# Patient Record
Sex: MALE | Race: WHITE | NOT HISPANIC OR LATINO | Employment: OTHER | URBAN - NONMETROPOLITAN AREA
[De-identification: names, ages, dates, MRNs, and addresses within clinical notes are randomized per-mention and may not be internally consistent; named-entity substitution may affect disease eponyms.]

---

## 2018-01-29 ENCOUNTER — HOSPITAL ENCOUNTER (EMERGENCY)
Facility: HOSPITAL | Age: 78
Discharge: HOME | End: 2018-01-29
Attending: PHYSICIAN ASSISTANT | Admitting: PHYSICIAN ASSISTANT
Payer: MEDICARE

## 2018-01-29 ENCOUNTER — APPOINTMENT (OUTPATIENT)
Dept: GENERAL RADIOLOGY | Facility: HOSPITAL | Age: 78
End: 2018-01-29
Attending: PHYSICIAN ASSISTANT
Payer: MEDICARE

## 2018-01-29 ENCOUNTER — ANESTHESIA (OUTPATIENT)
Dept: EMERGENCY MEDICINE | Facility: HOSPITAL | Age: 78
End: 2018-01-29
Payer: MEDICARE

## 2018-01-29 ENCOUNTER — ANESTHESIA EVENT (OUTPATIENT)
Dept: EMERGENCY MEDICINE | Facility: HOSPITAL | Age: 78
End: 2018-01-29
Payer: MEDICARE

## 2018-01-29 ENCOUNTER — APPOINTMENT (OUTPATIENT)
Dept: CT IMAGING | Facility: HOSPITAL | Age: 78
End: 2018-01-29
Attending: PHYSICIAN ASSISTANT
Payer: MEDICARE

## 2018-01-29 VITALS
RESPIRATION RATE: 29 BRPM | HEART RATE: 82 BPM | BODY MASS INDEX: 32.14 KG/M2 | HEIGHT: 66 IN | OXYGEN SATURATION: 96 % | SYSTOLIC BLOOD PRESSURE: 138 MMHG | WEIGHT: 200 LBS | TEMPERATURE: 98.9 F | DIASTOLIC BLOOD PRESSURE: 97 MMHG

## 2018-01-29 DIAGNOSIS — I48.91 ATRIAL FIBRILLATION, UNSPECIFIED TYPE (H): ICD-10-CM

## 2018-01-29 DIAGNOSIS — R07.9 CHEST PAIN, UNSPECIFIED TYPE: ICD-10-CM

## 2018-01-29 LAB
ALBUMIN SERPL-MCNC: 3.7 G/DL (ref 3.4–5)
ALP SERPL-CCNC: 72 U/L (ref 40–150)
ALT SERPL W P-5'-P-CCNC: 32 U/L (ref 0–70)
ANION GAP SERPL CALCULATED.3IONS-SCNC: 8 MMOL/L (ref 3–14)
AST SERPL W P-5'-P-CCNC: 23 U/L (ref 0–45)
BASOPHILS # BLD AUTO: 0.1 10E9/L (ref 0–0.2)
BASOPHILS NFR BLD AUTO: 0.5 %
BILIRUB SERPL-MCNC: 0.6 MG/DL (ref 0.2–1.3)
BUN SERPL-MCNC: 18 MG/DL (ref 7–30)
CALCIUM SERPL-MCNC: 8.5 MG/DL (ref 8.5–10.1)
CHLORIDE SERPL-SCNC: 99 MMOL/L (ref 94–109)
CO2 SERPL-SCNC: 29 MMOL/L (ref 20–32)
CREAT SERPL-MCNC: 0.81 MG/DL (ref 0.66–1.25)
DIFFERENTIAL METHOD BLD: ABNORMAL
EOSINOPHIL # BLD AUTO: 0.1 10E9/L (ref 0–0.7)
EOSINOPHIL NFR BLD AUTO: 0.8 %
ERYTHROCYTE [DISTWIDTH] IN BLOOD BY AUTOMATED COUNT: 14.6 % (ref 10–15)
GFR SERPL CREATININE-BSD FRML MDRD: >90 ML/MIN/1.7M2
GLUCOSE SERPL-MCNC: 285 MG/DL (ref 70–99)
HCT VFR BLD AUTO: 43.5 % (ref 40–53)
HGB BLD-MCNC: 14.7 G/DL (ref 13.3–17.7)
IMM GRANULOCYTES # BLD: 0.1 10E9/L (ref 0–0.4)
IMM GRANULOCYTES NFR BLD: 0.5 %
LYMPHOCYTES # BLD AUTO: 2 10E9/L (ref 0.8–5.3)
LYMPHOCYTES NFR BLD AUTO: 18.2 %
MCH RBC QN AUTO: 29.8 PG (ref 26.5–33)
MCHC RBC AUTO-ENTMCNC: 33.8 G/DL (ref 31.5–36.5)
MCV RBC AUTO: 88 FL (ref 78–100)
MONOCYTES # BLD AUTO: 0.8 10E9/L (ref 0–1.3)
MONOCYTES NFR BLD AUTO: 7.6 %
NEUTROPHILS # BLD AUTO: 8 10E9/L (ref 1.6–8.3)
NEUTROPHILS NFR BLD AUTO: 72.4 %
NRBC # BLD AUTO: 0 10*3/UL
NRBC BLD AUTO-RTO: 0 /100
NT-PROBNP SERPL-MCNC: 894 PG/ML (ref 0–1800)
PLATELET # BLD AUTO: 275 10E9/L (ref 150–450)
POTASSIUM SERPL-SCNC: 4.4 MMOL/L (ref 3.4–5.3)
PROT SERPL-MCNC: 7.5 G/DL (ref 6.8–8.8)
RBC # BLD AUTO: 4.94 10E12/L (ref 4.4–5.9)
SODIUM SERPL-SCNC: 136 MMOL/L (ref 133–144)
TROPONIN I SERPL-MCNC: <0.015 UG/L (ref 0–0.04)
TROPONIN I SERPL-MCNC: <0.015 UG/L (ref 0–0.04)
WBC # BLD AUTO: 11.1 10E9/L (ref 4–11)

## 2018-01-29 PROCEDURE — A9270 NON-COVERED ITEM OR SERVICE: HCPCS | Mod: GY

## 2018-01-29 PROCEDURE — 93010 ELECTROCARDIOGRAM REPORT: CPT | Performed by: INTERNAL MEDICINE

## 2018-01-29 PROCEDURE — 25000132 ZZH RX MED GY IP 250 OP 250 PS 637: Mod: GY

## 2018-01-29 PROCEDURE — 25000128 H RX IP 250 OP 636: Performed by: PHYSICIAN ASSISTANT

## 2018-01-29 PROCEDURE — 99284 EMERGENCY DEPT VISIT MOD MDM: CPT | Performed by: PHYSICIAN ASSISTANT

## 2018-01-29 PROCEDURE — 85025 COMPLETE CBC W/AUTO DIFF WBC: CPT | Performed by: PHYSICIAN ASSISTANT

## 2018-01-29 PROCEDURE — 80053 COMPREHEN METABOLIC PANEL: CPT | Performed by: PHYSICIAN ASSISTANT

## 2018-01-29 PROCEDURE — 99285 EMERGENCY DEPT VISIT HI MDM: CPT | Mod: 25

## 2018-01-29 PROCEDURE — 83880 ASSAY OF NATRIURETIC PEPTIDE: CPT | Performed by: PHYSICIAN ASSISTANT

## 2018-01-29 PROCEDURE — 84484 ASSAY OF TROPONIN QUANT: CPT | Mod: 91 | Performed by: PHYSICIAN ASSISTANT

## 2018-01-29 PROCEDURE — 71046 X-RAY EXAM CHEST 2 VIEWS: CPT | Mod: TC

## 2018-01-29 PROCEDURE — 37000011 ZZH ANESTHESIA WARD SERVICE: Performed by: NURSE ANESTHETIST, CERTIFIED REGISTERED

## 2018-01-29 PROCEDURE — 36415 COLL VENOUS BLD VENIPUNCTURE: CPT | Performed by: PHYSICIAN ASSISTANT

## 2018-01-29 PROCEDURE — 71275 CT ANGIOGRAPHY CHEST: CPT | Mod: TC

## 2018-01-29 PROCEDURE — 93005 ELECTROCARDIOGRAM TRACING: CPT

## 2018-01-29 RX ORDER — IOPAMIDOL 755 MG/ML
75 INJECTION, SOLUTION INTRAVASCULAR ONCE
Status: COMPLETED | OUTPATIENT
Start: 2018-01-29 | End: 2018-01-29

## 2018-01-29 RX ORDER — METOPROLOL SUCCINATE 25 MG/1
25 TABLET, EXTENDED RELEASE ORAL DAILY
Qty: 30 TABLET | Refills: 0 | Status: SHIPPED | OUTPATIENT
Start: 2018-01-29 | End: 2018-02-28

## 2018-01-29 RX ORDER — METOPROLOL TARTRATE 25 MG/1
TABLET, FILM COATED ORAL
Status: COMPLETED
Start: 2018-01-29 | End: 2018-01-29

## 2018-01-29 RX ADMIN — METOPROLOL TARTRATE 12.5 MG: 25 TABLET ORAL at 21:38

## 2018-01-29 RX ADMIN — IOPAMIDOL 75 ML: 755 INJECTION, SOLUTION INTRAVENOUS at 20:28

## 2018-01-29 ASSESSMENT — ENCOUNTER SYMPTOMS
CHILLS: 0
DIZZINESS: 0
HEADACHES: 0
NAUSEA: 0
ABDOMINAL PAIN: 0
PALPITATIONS: 0
VOMITING: 0
LIGHT-HEADEDNESS: 0
DIARRHEA: 0
FATIGUE: 0
CHEST TIGHTNESS: 0
ACTIVITY CHANGE: 0
APPETITE CHANGE: 0
COUGH: 0
FEVER: 0

## 2018-01-29 NOTE — ED NOTES
Bed: ED10a  Expected date: 1/29/18  Expected time:   Means of arrival:   Comments:  Chest pain, private vehicle

## 2018-01-29 NOTE — ED AVS SNAPSHOT
[facility]    [address] [address]    Phone:  [phone]                                       Jonatan Das   MRN: [id number]    Department:  [facility]   Date of Visit:  1/29/2018           After Visit Summary Signature Page     I have received my discharge instructions, and my questions have been answered. I have discussed any challenges I see with this plan with the nurse or doctor.    ..........................................................................................................................................  Patient/Patient Representative Signature      ..........................................................................................................................................  Patient Representative Print Name and Relationship to Patient    ..................................................               ................................................  Date                                            Time    ..........................................................................................................................................  Reviewed by Signature/Title    ...................................................              ..............................................  Date                                                            Time

## 2018-01-29 NOTE — ED AVS SNAPSHOT
[facility]    [address] [place] [address]    Phone:  [phone]                                       Jonatan Das   MRN: [id number]    Department:  [facility]   Date of Visit:  1/29/2018           Patient Information     Date Of Birth          1940        Your diagnoses for this visit were:     Chest pain, unspecified type     Atrial fibrillation, unspecified type (H)        You were seen by Ivette Lyles PA-C.      Follow-up Information     Follow up with Clinic, [facility].    Contact information:    [address]  [address]  [place] [address]  [phone]          Follow up with [facility].    Specialty:  EMERGENCY MEDICINE    Why:  If symptoms worsen    Contact information:    [address]  [place] [address] [phone]    Additional information:    From [facility]: Take [address]. Turn left at [address]. Turn left at the first stoplight on [address]. At the first stop sign, take a right onto [address]. Take a left into the parking lot and continue through until you reach the North enterance of the building.       From [place]: Take [address]. Take the [address] ramp towards [place]. Turn left onto [address]. Take a slight right onto [address]. Turn left at the first stoplight on [address]. At the first stop sign, take a right onto [address]. Take a left into the parking lot and continue through until you reach the North enterance of the building.       From [place]: Take [address]. Take a right at [address]. At the first stop sign, take a right onto [address]. Take a left into the parking lot and continue through until you reach the North enterance of the building.         Discharge Instructions       What to expect when you have contrast    During your exam, we will inject  contrast  into your vein or  artery. (Contrast is a clear liquid with iodine in it. It shows up on X-rays.)    You may feel warm or hot. You may have a metal taste in your mouth and a slight upset stomach. You may also feel pressure near the kidneys and bladder. These effects will last about 1 to 3 minutes.    Please tell us if you have:    Sneezing     Itching    Hives     Swelling in the face    A hoarse voice    Breathing problems    Other new symptoms    Serious problems are rare.  They may include:    Irregular heartbeat     Seizures    Kidney failure              Tissue damage    Shock      Death    If you have any problems during the exam, we  will treat them right away.    When you get home    Call your hospital if you have any new symptoms in the next 2 days, like hives or swelling. (Phone numbers are at the bottom of this page.) Or call your family doctor.     If you have wheezing or trouble breathing, call 911.    Self-care  -Drink at least 4 extra glasses of water today.   This reduces the stress on your kidneys.  -Keep taking your regular medicines.    The contrast will pass out of your body in your  Urine(pee). This will happen in the next 24 hours. You  will not feel this. Your urine will not  change color.    If you have kidney problems or take metformin    Drink 4 to 8 large glasses of water for the next  2 days, if you are not on a fluid restriction.    ?If you take metformin (Glucophage or Glucovance) for diabetes, keep taking it.        (Note to provider:please give patient prescription for lab tests.)    ?Special instructions: DRINK EXTRA WATER FOR THE NEXT 2 DAYS    I have read and understand the above information.    Patient Sign Here:______________________________________Date:________Time:______    Staff Sign Here:________________________________________Date:_______Time:______      Radiology Departments:     ?[facility]: [phone] ?[facility]: [phone]     ?[place]: [phone] ?[facility]:[phone]       ?[place]: [phone]  ?[place]: [phone]  ?[place]:[phone]    ?[facility]:[phone]  ?[facility]:[phone]       Review of your medicines      START taking        Dose / Directions Last dose taken    metoprolol succinate 25 MG 24 hr tablet   Commonly known as:  TOPROL-XL   Dose:  25 mg   Quantity:  30 tablet        Take 1 tablet (25 mg) by mouth daily   Refills:  0          Our records show that you are taking the medicines listed below. If these are incorrect, please call your family doctor or clinic.        Dose / Directions Last dose taken    acetaminophen 325 MG tablet   Commonly known as:  TYLENOL   Dose:  650 mg   Quantity:  100 tablet        Take 2 tablets (650 mg) by mouth every 4 hours as needed for mild pain   Refills:  0        acetaminophen-codeine 300-30 MG per tablet   Commonly known as:  TYLENOL #3   Dose:  1-2 tablet        Take 1-2 tablets by mouth every 4 hours as needed for moderate pain   Refills:  0        ASPIRIN PO   Dose:  325 mg        Take 325 mg by mouth daily   Refills:  0        ATORVASTATIN CALCIUM PO   Dose:  80 mg        Take 80 mg by mouth daily   Refills:  0        insulin glargine 100 UNIT/ML injection   Commonly known as:  LANTUS   Dose:  50 Units        Inject 50 Units Subcutaneous 2 times daily   Refills:  0        LISINOPRIL PO   Dose:  2.5 mg        Take 2.5 mg by mouth daily   Refills:  0        METFORMIN HCL PO   Dose:  1000 mg        Take 1,000 mg by mouth daily (with breakfast)   Refills:  0                Prescriptions were sent or printed at these locations (1 Prescription)                   [facility] - [place]ILARY HERN[facility]NDEZ - [address]   [address] [address]    Telephone:  [phone]   Fax:  [phone]   Hours:                  E-Prescribed (1 of 1)         metoprolol succinate (TOPROL-XL) 25 MG 24 hr tablet                Procedures and tests performed during your visit     Procedure/Test Number of Times  "Performed    CBC with platelets differential 1    CTA Angiogram Chest w/o & w Contrast 1    Comprehensive metabolic panel 1    EKG 12-lead, tracing only 1    Nt probnp inpatient (BNP) 1    Peripheral IV catheter 1    Troponin I 2    XR Chest 2 Views 1      Orders Needing Specimen Collection     None      Pending Results     No orders found from 2018 to 2018.            Pending Culture Results     No orders found from 2018 to 2018.            Thank you for choosing [facility]       Thank you for choosing [facility] for your care. Our goal is always to provide you with excellent care. Hearing back from our patients is one way we can continue to improve our services. Please take a few minutes to complete the written survey that you may receive in the mail after you visit with us. Thank you!        [employer] Information     [employer] lets you send messages to your doctor, view your test results, renew your prescriptions, schedule appointments and more. To sign up, go to [email or web address] . Click on \"Log in\" on the left side of the screen, which will take you to the Welcome page. Then click on \"Sign up Now\" on the right side of the page.     You will be asked to enter the access code listed below, as well as some personal information. Please follow the directions to create your username and password.     Your access code is: [email or web address]  Expires: 2018  9:30 PM     Your access code will  in 90 days. If you need help or a new code, please call your [facility] clinic or [phone].        Care EveryWhere ID     This is your Care EveryWhere ID. This could be used by other organizations to access your [facility] medical records  [id number]        Equal Access to Services     [facility] : Lizzie Gaspar, michael almanza, percy castelan. So [facility] [phone].    ATENCIÓN: Si habla español, tiene a holman disposición servicios " freeman de asistencia lingüística. Quintin torres [phone].    We comply with applicable federal civil rights laws and [place] laws. We do not discriminate on the basis of race, color, national origin, age, disability, sex, sexual orientation, or gender identity.            After Visit Summary       This is your record. Keep this with you and show to your community pharmacist(s) and doctor(s) at your next visit.

## 2018-01-30 NOTE — ED NOTES
1/30/18 @ 1120:  [facility] stating no rx there. E scribed per JOHANN Barrera toprol xl 25 mg one tab daily for 30 days with no refills.

## 2018-01-30 NOTE — ED NOTES
Discharge instructions gone over with patient and he states understanding.  Patient is then discharged in stable condition ambulatory to waiting room to wait for son. We called him and he is coming to pick him up.

## 2018-01-30 NOTE — DISCHARGE INSTRUCTIONS
What to expect when you have contrast    During your exam, we will inject  contrast  into your vein or artery. (Contrast is a clear liquid with iodine in it. It shows up on X-rays.)    You may feel warm or hot. You may have a metal taste in your mouth and a slight upset stomach. You may also feel pressure near the kidneys and bladder. These effects will last about 1 to 3 minutes.    Please tell us if you have:    Sneezing     Itching    Hives     Swelling in the face    A hoarse voice    Breathing problems    Other new symptoms    Serious problems are rare.  They may include:    Irregular heartbeat     Seizures    Kidney failure              Tissue damage    Shock      Death    If you have any problems during the exam, we  will treat them right away.    When you get home    Call your hospital if you have any new symptoms in the next 2 days, like hives or swelling. (Phone numbers are at the bottom of this page.) Or call your family doctor.     If you have wheezing or trouble breathing, call 911.    Self-care  -Drink at least 4 extra glasses of water today.   This reduces the stress on your kidneys.  -Keep taking your regular medicines.    The contrast will pass out of your body in your  Urine(pee). This will happen in the next 24 hours. You  will not feel this. Your urine will not  change color.    If you have kidney problems or take metformin    Drink 4 to 8 large glasses of water for the next  2 days, if you are not on a fluid restriction.    ?If you take metformin (Glucophage or Glucovance) for diabetes, keep taking it.        (Note to provider:please give patient prescription for lab tests.)    ?Special instructions: DRINK EXTRA WATER FOR THE NEXT 2 DAYS    I have read and understand the above information.    Patient Sign Here:______________________________________Date:________Time:______    Staff Sign Here:________________________________________Date:_______Time:______      Radiology Departments:     ?[facility]: [phone] ?[place]: [phone]     ?[place]: [phone] ?[facility]:[phone]      ?[place]: [phone]  ?[place]: [phone]  ?[place]:[phone]    ?[facility]:[phone]  ?[facility]:[phone]

## 2018-01-30 NOTE — ED NOTES
"Pt getting worked up about the probability of taking Coumadin. \"There are so many side effects from those medicines.\"  "

## 2018-01-30 NOTE — ED NOTES
Patient had IV in left AC that was not functioning when CT took him for Chest CT. He was brought bad to room and when attempting to flush IV it infiltrated and was removed. Myself and 1 other RN attempted at starting another IV without success.  Anesthesia is called to come and look.

## 2018-02-21 ENCOUNTER — DOCUMENTATION ONLY (OUTPATIENT)
Dept: FAMILY MEDICINE | Facility: OTHER | Age: 78
End: 2018-02-21

## 2018-04-16 ENCOUNTER — TELEPHONE (OUTPATIENT)
Dept: CARDIOLOGY | Facility: OTHER | Age: 78
End: 2018-04-16

## 2018-04-16 NOTE — TELEPHONE ENCOUNTER
Milly from [facility] in [place] called [phone]) stating that patients echo needs to be rescheduled because the patient does not want to leave with all of the snow.  Call sent to Radiology department to reschedule.  After reviewing the chart, call placed back to Milly and informed her that this is not a patient of Dr. Ramirez, Roxi Morgan, or Dr. Chavez so she should find out who the primary is and inform them.  Milly states understanding and will find out who ordered this and which doctor patient is seeing.  Milly also states she left a message with [facility] to call her back to reschedule the echo.

## 2018-08-06 NOTE — ED PROVIDER NOTES
History     Chief Complaint   Patient presents with     Chest Pain     The history is provided by the patient and a relative.     Jonatan Das is a 77 year old male who presented to the emergency department ambulatory along with son for evaluation of chest pain.  Chest pain began approximately 2 hours prior to arrival.  Son is a local paramedic. His son did a 12-lead EKG and found his father was a new onset atrial fibrillation.  The chest pain was described as severe, sharp, located in the anterior chest and left lower abdomen.  There was radiation to the left arm as well as down the left leg.  He took 2 nitroglycerin with improvement of the pain from 8 on the 10 scale to a 3 on the 10 scale.  On his arrival here he denied pain.  He does admit to some increasing dyspnea on exertion recently.  No fever.  No dyspnea at rest.  He has had an appendectomy as well as cholecystectomy.    Problem List:    Patient Active Problem List    Diagnosis Date Noted     History of stroke 05/24/2015     Priority: Medium     Aspiration pneumonia (H) 05/24/2015     Priority: Medium     Diabetes mellitus, type 2 (H) 05/24/2015     Priority: Medium     S/P appendectomy 05/24/2015     Priority: Medium     S/P cholecystectomy 05/24/2015     Priority: Medium     Mixed hyperlipidemia 05/24/2015     Priority: Medium     Flu 12/22/2014     Priority: Medium        Past Medical History:    Past Medical History:   Diagnosis Date     Arthritis      CVA (cerebral infarction)      Diabetes (H)        Past Surgical History:    Past Surgical History:   Procedure Laterality Date     CHOLECYSTECTOMY       GENITOURINARY SURGERY       ORTHOPEDIC SURGERY      knee surgery       Family History:    No family history on file.    Social History:  Marital Status:   [2]  Social History   Substance Use Topics     Smoking status: Never Smoker     Smokeless tobacco: Not on file     Alcohol use No        Medications:      acetaminophen-codeine (TYLENOL  "#3) 300-30 MG per tablet   ASPIRIN PO   metoprolol succinate (TOPROL-XL) 25 MG 24 hr tablet   METFORMIN HCL PO   LISINOPRIL PO   ATORVASTATIN CALCIUM PO   insulin glargine (LANTUS VIAL) 100 UNIT/ML soln   acetaminophen (TYLENOL) 325 MG tablet         Review of Systems   Constitutional: Negative for activity change, appetite change, chills, fatigue and fever.   Eyes:        Left eye blindness   Respiratory: Negative for cough and chest tightness.         He admits to some minimal increasing shortness of breath with exertion recently   Cardiovascular: Positive for chest pain. Negative for palpitations.   Gastrointestinal: Negative for abdominal pain, diarrhea, nausea and vomiting.   Genitourinary: Negative.    Skin: Negative.    Neurological: Negative for dizziness, light-headedness and headaches.       Physical Exam   BP: 144/71  Pulse: 82  Heart Rate: 73  Temp: 98.7  F (37.1  C)  Resp: 21  Height: 167.6 cm (5' 6\")  Weight: 90.7 kg (200 lb)  SpO2: 94 %      Physical Exam   Constitutional: He is oriented to person, place, and time. He appears well-developed and well-nourished. No distress.   Eyes:   Left eye patch/[place]   Cardiovascular: Normal rate.    Irregularly irregular   Pulmonary/Chest: Effort normal and breath sounds normal.   Abdominal: Soft. There is no tenderness. There is no guarding.   Neurological: He is alert and oriented to person, place, and time.   Skin: Skin is warm and dry.   Psychiatric: He has a normal mood and affect.   Nursing note and vitals reviewed.      ED Course     ED Course     Procedures          EKG shows atrial fibrillation.  He has nonspecific T-wave inversions in lead I and aVL.  In reviewing his previous EKGs from 2014 the T-wave abnormalities were present at that time.    Chest ray is negative for focal infiltrate, widened mediastinum, or pneumothorax.    Medications   iopamidol (ISOVUE-370) solution 75 mL (75 mLs Intravenous Given 1/29/18 2028)   sodium chloride (PF) 0.9% PF " flush 80 mL (80 mLs Intravenous Given 1/29/18 2028)   metoprolol tartrate (LOPRESSOR) half-tab 12.5 mg (12.5 mg Oral Given 1/29/18 2138)     Results for orders placed or performed during the hospital encounter of 01/29/18   XR Chest 2 Views    Narrative    XR CHEST 2 VW    HISTORY: 77 years Male chest pain;     COMPARISON: 5/20/2015    TECHNIQUE: 2 views of the chest were obtained.    FINDINGS: Two views of the chest were obtained. Heart size and  pulmonary vascularity are within normal limits, lungs are clear both  views. No consolidating air space opacities are present.          Impression    IMPRESSION: Clear chest.    MARILYN MCDANIEL MD   CTA Angiogram Chest w/o & w Contrast    Narrative    CTA ANGIOGRAM CHEST CONTRAST    HISTORY: 77 years Male with chest pain. Dissection;     TECHNIQUE: Axial CT imaging of the chest was performed With  intravenous contrast. Coronal and sagittal reconstructions were  obtained.    COMPARISON: None    FINDINGS:  There is no evidence of thoracic aortic aneurysm or dissection. There  are no filling defects within the pulmonary arteries to suggest  pulmonary embolism.  There is no mediastinal or hilar or axillary lymphadenopathy.    There is mild dependent atelectasis. There is a stellate opacity  within the lingula close to the costophrenic sulcus with architectural  distortion suggestive of atelectasis. Lungs are otherwise clear.         There is a wedge-shaped low-density lesion within the spleen. There is  no change in the contour of the splenic capsule.      Impression    IMPRESSION: No evidence of aortic aneurysm or thoracic aortic  dissection. There is no evidence of pulmonary embolism.    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    MARILYN MCDANIEL MD   CBC with platelets differential   Result Value Ref [place]    WBC 11.1 (H) 4.0 - 11.0 10e9/L    RBC Count 4.94 4.4 - 5.9 10e12/L    Hemoglobin 14.7 13.3 - 17.7 g/dL     Hematocrit 43.5 40.0 - 53.0 %    MCV 88 78 - 100 fl    MCH 29.8 26.5 - 33.0 pg    MCHC 33.8 31.5 - 36.5 g/dL    RDW 14.6 10.0 - 15.0 %    Platelet Count 275 150 - 450 10e9/L    Diff Method Automated Method     % Neutrophils 72.4 %    % Lymphocytes 18.2 %    % Monocytes 7.6 %    % Eosinophils 0.8 %    % Basophils 0.5 %    % Immature Granulocytes 0.5 %    Nucleated RBCs 0 0 /100    Absolute Neutrophil 8.0 1.6 - 8.3 10e9/L    Absolute Lymphocytes 2.0 0.8 - 5.3 10e9/L    Absolute Monocytes 0.8 0.0 - 1.3 10e9/L    Absolute Eosinophils 0.1 0.0 - 0.7 10e9/L    Absolute Basophils 0.1 0.0 - 0.2 10e9/L    Abs Immature Granulocytes 0.1 0 - 0.4 10e9/L    Absolute Nucleated RBC 0.0    Comprehensive metabolic panel   Result Value Ref [place]    Sodium 136 133 - 144 mmol/L    Potassium 4.4 3.4 - 5.3 mmol/L    Chloride 99 94 - 109 mmol/L    Carbon Dioxide 29 20 - 32 mmol/L    Anion Gap 8 3 - 14 mmol/L    Glucose 285 (H) 70 - 99 mg/dL    Urea Nitrogen 18 7 - 30 mg/dL    Creatinine 0.81 0.66 - 1.25 mg/dL    GFR Estimate >90 >60 mL/min/1.7m2    GFR Estimate If Black >90 >60 mL/min/1.7m2    Calcium 8.5 8.5 - 10.1 mg/dL    Bilirubin Total 0.6 0.2 - 1.3 mg/dL    Albumin 3.7 3.4 - 5.0 g/dL    Protein Total 7.5 6.8 - 8.8 g/dL    Alkaline Phosphatase 72 40 - 150 U/L    ALT 32 0 - 70 U/L    AST 23 0 - 45 U/L   Troponin I   Result Value Ref [place]    Troponin I ES <0.015 0.000 - 0.045 ug/L   Nt probnp inpatient (BNP)   Result Value Ref [place]    N-Terminal Pro BNP Inpatient 894 0 - 1800 pg/mL   Troponin I   Result Value Ref [place]    Troponin I ES <0.015 0.000 - 0.045 ug/L     Critical Care time:  none               Labs Ordered and Resulted from Time of ED Arrival Up to the Time of Departure from the ED   CBC WITH PLATELETS DIFFERENTIAL - Abnormal; Notable for the following:        Result Value    WBC 11.1 (*)     All other components within normal limits   COMPREHENSIVE METABOLIC PANEL - Abnormal; Notable for the following:     Glucose 285 (*)      All other components within normal limits   TROPONIN I   NT PROBNP INPATIENT   TROPONIN I   PERIPHERAL IV CATHETER       Assessments & Plan (with Medical Decision Making)   Mr. Das was observed for greater than 4 hours.  He had no chest pain here in the emergency department.  Not consistent with ACS. He feels at his baseline.  Troponin negative ×2.  CT angiogram negative for thoracic aortic aneurysm or pulmonary embolism.  Atrial fibrillation is new, but duration is unknown.  He does have a history of CVA.  His [id number] is 6.  This obviously places him at high risk of CVA.  Discussed with Dr. Zafar.  We will start low dose metoprolol.  I had a long discussion regarding coumadin therapy.  We discussed the risks and benefits at length.  Jonatan was very uncomfortable starting this medication from the ED.  I agree.  This can be discussed in the next 1-2 days with [facility].  Return here for ANY return of pain or other concerns.     I have reviewed the nursing notes.    I have reviewed the findings, diagnosis, plan and need for follow up with the patient.       Discharge Medication List as of 1/29/2018  9:31 PM      START taking these medications    Details   metoprolol succinate (TOPROL-XL) 25 MG 24 hr tablet Take 1 tablet (25 mg) by mouth daily, Disp-30 tablet, R-0, E-Prescribe             Final diagnoses:   Chest pain, unspecified type   Atrial fibrillation, unspecified type (H)       1/29/2018   [facility]     Ivette Lyles PA-C  01/29/18 7749     black pouch bag, silver wallet/Other belongings

## 2020-12-02 ENCOUNTER — HOSPITAL ENCOUNTER (EMERGENCY)
Facility: OTHER | Age: 80
Discharge: HOME | End: 2020-12-02
Attending: FAMILY MEDICINE | Admitting: FAMILY MEDICINE
Payer: COMMERCIAL

## 2020-12-02 ENCOUNTER — APPOINTMENT (OUTPATIENT)
Dept: CT IMAGING | Facility: OTHER | Age: 80
End: 2020-12-02
Attending: FAMILY MEDICINE
Payer: COMMERCIAL

## 2020-12-02 ENCOUNTER — APPOINTMENT (OUTPATIENT)
Dept: GENERAL RADIOLOGY | Facility: OTHER | Age: 80
End: 2020-12-02
Attending: FAMILY MEDICINE
Payer: COMMERCIAL

## 2020-12-02 VITALS
HEIGHT: 66 IN | RESPIRATION RATE: 12 BRPM | HEART RATE: 74 BPM | DIASTOLIC BLOOD PRESSURE: 65 MMHG | BODY MASS INDEX: 36.8 KG/M2 | SYSTOLIC BLOOD PRESSURE: 108 MMHG | WEIGHT: 229 LBS | TEMPERATURE: 97.5 F | OXYGEN SATURATION: 98 %

## 2020-12-02 DIAGNOSIS — I51.89 DIASTOLIC DYSFUNCTION: ICD-10-CM

## 2020-12-02 LAB
ALBUMIN SERPL-MCNC: 3.9 G/DL (ref 3.5–5.7)
ALP SERPL-CCNC: 55 U/L (ref 34–104)
ALT SERPL W P-5'-P-CCNC: 14 U/L (ref 7–52)
ANION GAP SERPL CALCULATED.3IONS-SCNC: 9 MMOL/L (ref 3–14)
AST SERPL W P-5'-P-CCNC: 17 U/L (ref 13–39)
BASOPHILS # BLD AUTO: 0.1 10E9/L (ref 0–0.2)
BASOPHILS NFR BLD AUTO: 0.7 %
BILIRUB SERPL-MCNC: 0.7 MG/DL (ref 0.3–1)
BUN SERPL-MCNC: 19 MG/DL (ref 7–25)
CALCIUM SERPL-MCNC: 9.1 MG/DL (ref 8.6–10.3)
CHLORIDE SERPL-SCNC: 104 MMOL/L (ref 98–107)
CO2 SERPL-SCNC: 23 MMOL/L (ref 21–31)
CREAT SERPL-MCNC: 0.97 MG/DL (ref 0.7–1.3)
DIFFERENTIAL METHOD BLD: ABNORMAL
EOSINOPHIL # BLD AUTO: 0.1 10E9/L (ref 0–0.7)
EOSINOPHIL NFR BLD AUTO: 1.3 %
ERYTHROCYTE [DISTWIDTH] IN BLOOD BY AUTOMATED COUNT: 15.7 % (ref 10–15)
GFR SERPL CREATININE-BSD FRML MDRD: 75 ML/MIN/{1.73_M2}
GLUCOSE SERPL-MCNC: 150 MG/DL (ref 70–105)
HCT VFR BLD AUTO: 41.3 % (ref 40–53)
HGB BLD-MCNC: 13.2 G/DL (ref 13.3–17.7)
IMM GRANULOCYTES # BLD: 0.1 10E9/L (ref 0–0.4)
IMM GRANULOCYTES NFR BLD: 1.2 %
INR PPP: 2.28 (ref 0.86–1.14)
LIPASE SERPL-CCNC: 5 U/L (ref 11–82)
LYMPHOCYTES # BLD AUTO: 1.6 10E9/L (ref 0.8–5.3)
LYMPHOCYTES NFR BLD AUTO: 23 %
MCH RBC QN AUTO: 27.7 PG (ref 26.5–33)
MCHC RBC AUTO-ENTMCNC: 32 G/DL (ref 31.5–36.5)
MCV RBC AUTO: 87 FL (ref 78–100)
MONOCYTES # BLD AUTO: 0.5 10E9/L (ref 0–1.3)
MONOCYTES NFR BLD AUTO: 7.5 %
NEUTROPHILS # BLD AUTO: 4.5 10E9/L (ref 1.6–8.3)
NEUTROPHILS NFR BLD AUTO: 66.3 %
NT-PROBNP SERPL-MCNC: 166 PG/ML (ref 0–100)
PLATELET # BLD AUTO: 247 10E9/L (ref 150–450)
POTASSIUM SERPL-SCNC: 4 MMOL/L (ref 3.5–5.1)
PROT SERPL-MCNC: 6.8 G/DL (ref 6.4–8.9)
RBC # BLD AUTO: 4.76 10E12/L (ref 4.4–5.9)
SODIUM SERPL-SCNC: 136 MMOL/L (ref 134–144)
TROPONIN I SERPL-MCNC: 10 PG/ML
WBC # BLD AUTO: 6.8 10E9/L (ref 4–11)

## 2020-12-02 PROCEDURE — 74177 CT ABD & PELVIS W/CONTRAST: CPT

## 2020-12-02 PROCEDURE — 99285 EMERGENCY DEPT VISIT HI MDM: CPT | Mod: 25 | Performed by: FAMILY MEDICINE

## 2020-12-02 PROCEDURE — 36415 COLL VENOUS BLD VENIPUNCTURE: CPT | Performed by: FAMILY MEDICINE

## 2020-12-02 PROCEDURE — 83880 ASSAY OF NATRIURETIC PEPTIDE: CPT | Performed by: FAMILY MEDICINE

## 2020-12-02 PROCEDURE — 80053 COMPREHEN METABOLIC PANEL: CPT | Performed by: FAMILY MEDICINE

## 2020-12-02 PROCEDURE — 85025 COMPLETE CBC W/AUTO DIFF WBC: CPT | Performed by: FAMILY MEDICINE

## 2020-12-02 PROCEDURE — 83690 ASSAY OF LIPASE: CPT | Performed by: FAMILY MEDICINE

## 2020-12-02 PROCEDURE — 99284 EMERGENCY DEPT VISIT MOD MDM: CPT | Performed by: FAMILY MEDICINE

## 2020-12-02 PROCEDURE — 85610 PROTHROMBIN TIME: CPT | Performed by: FAMILY MEDICINE

## 2020-12-02 PROCEDURE — 71045 X-RAY EXAM CHEST 1 VIEW: CPT

## 2020-12-02 PROCEDURE — 84484 ASSAY OF TROPONIN QUANT: CPT | Performed by: FAMILY MEDICINE

## 2020-12-02 PROCEDURE — 255N000002 HC RX 255 OP 636: Performed by: FAMILY MEDICINE

## 2020-12-02 RX ORDER — FUROSEMIDE 20 MG
20 TABLET ORAL 2 TIMES DAILY
Qty: 60 TABLET | Refills: 1 | Status: SHIPPED | OUTPATIENT
Start: 2020-12-02

## 2020-12-02 RX ORDER — FUROSEMIDE 20 MG
20 TABLET ORAL 2 TIMES DAILY
Qty: 60 TABLET | Refills: 1 | Status: SHIPPED | OUTPATIENT
Start: 2020-12-02 | End: 2020-12-02

## 2020-12-02 RX ORDER — POTASSIUM CHLORIDE 750 MG/1
10 TABLET, EXTENDED RELEASE ORAL 2 TIMES DAILY
Qty: 60 TABLET | Refills: 1 | Status: SHIPPED | OUTPATIENT
Start: 2020-12-02 | End: 2020-12-02

## 2020-12-02 RX ORDER — POTASSIUM CHLORIDE 750 MG/1
10 TABLET, EXTENDED RELEASE ORAL 2 TIMES DAILY
Qty: 60 TABLET | Refills: 1 | Status: SHIPPED | OUTPATIENT
Start: 2020-12-02

## 2020-12-02 RX ORDER — POTASSIUM CHLORIDE 750 MG/1
TABLET, EXTENDED RELEASE ORAL
Qty: 180 TABLET | OUTPATIENT
Start: 2020-12-02

## 2020-12-02 RX ADMIN — IOHEXOL 100 ML: 350 INJECTION, SOLUTION INTRAVENOUS at 14:25

## 2020-12-02 ASSESSMENT — ENCOUNTER SYMPTOMS
CHEST TIGHTNESS: 0
LIGHT-HEADEDNESS: 0
FLANK PAIN: 0
SHORTNESS OF BREATH: 1
ABDOMINAL DISTENTION: 1
BACK PAIN: 0

## 2020-12-02 ASSESSMENT — MIFFLIN-ST. JEOR: SCORE: 1701.49

## 2020-12-02 NOTE — ED NOTES
Patient is usually seen in [facility]. Patient also states that he has a heart arhythmia but is unsure what it is called. Patient does not know all his medications due to a nurse filling his med box for him. Cardiac rhythm is current alarming AFIB.

## 2020-12-02 NOTE — ED PROVIDER NOTES
"  History     Chief Complaint   Patient presents with     Bloated     HPI  Jonatan Das is a 80 year old male who presents to the emergency department with worsening abdominal distention and leg swelling.  He states he has gradually been getting more shortness of breath with exertion over several years but certainly no abrupt change in his shortness of breath.  He does not endorse chest pain or recent fevers chills cough or congestion.  Patient states he has no history of heart problems however upon review of his medical record I did find a 2014 echocardiogram that showed diastolic dysfunction with a ejection fraction of 55%.    Reviewed nurses notes below, similar history is related to me.  Jonatan Das is a 80 year old Male that presents to triage private car  With history of  Bilateral feet and legs swelling as well as distended abdomen. Patient states that he has gained about 25lbs over the past 2 months with increased work of breathing. C/o SOB and inability to do anything.        Allergies:  Allergies   Allergen Reactions     Tetanus Immune Globulin Other (See Comments) and Unknown     Patient states he \"went out like a light\" and \"fell on the floor\" after a tetanus shot.       Problem List:    Patient Active Problem List    Diagnosis Date Noted     History of stroke 05/24/2015     Priority: Medium     Aspiration pneumonia (H) 05/24/2015     Priority: Medium     Diabetes mellitus, type 2 (H) 05/24/2015     Priority: Medium     S/P appendectomy 05/24/2015     Priority: Medium     S/P cholecystectomy 05/24/2015     Priority: Medium     Mixed hyperlipidemia 05/24/2015     Priority: Medium     Flu 12/22/2014     Priority: Medium        Past Medical History:    Past Medical History:   Diagnosis Date     Arthritis      CVA (cerebral infarction)      Diabetes (H)        Past Surgical History:    Past Surgical History:   Procedure Laterality Date     CHOLECYSTECTOMY       GENITOURINARY SURGERY       ORTHOPEDIC " "SURGERY      knee surgery       Family History:    History reviewed. No pertinent family history.    Social History:  Marital Status:   [2]  Social History     Tobacco Use     Smoking status: Never Smoker     Smokeless tobacco: Never Used   Substance Use Topics     Alcohol use: No     Drug use: No        Medications:         acetaminophen (TYLENOL) 325 MG tablet       ASPIRIN PO       ATORVASTATIN CALCIUM PO       furosemide (LASIX) 20 MG tablet       insulin glargine (LANTUS VIAL) 100 UNIT/ML soln       LISINOPRIL PO       METFORMIN HCL PO       potassium chloride ER (KLOR-CON M) 10 MEQ CR tablet       acetaminophen-codeine (TYLENOL #3) 300-30 MG per tablet       metoprolol succinate (TOPROL-XL) 25 MG 24 hr tablet          Review of Systems   Respiratory: Positive for shortness of breath. Negative for chest tightness.    Gastrointestinal: Positive for abdominal distention.   Genitourinary: Negative for flank pain.   Musculoskeletal: Negative for back pain.   Neurological: Negative for light-headedness.       Physical Exam   BP: (!) 147/53  Pulse: 72  Temp: 97.5  F (36.4  C)  Resp: 26  Height: 167.6 cm (5' 6\")  Weight: 103.9 kg (229 lb)  SpO2: 98 %      Physical Exam  Vitals signs and nursing note reviewed.   Neck:      Musculoskeletal: Normal range of motion.   Cardiovascular:      Rate and Rhythm: Normal rate.   Pulmonary:      Breath sounds: Rales present.   Musculoskeletal:      Right lower leg: Edema present.      Left lower leg: Edema present.   Neurological:      Mental Status: He is alert.         ED Course        Procedures    Results for orders placed or performed during the hospital encounter of 12/02/20 (from the past 24 hour(s))   CBC with platelets differential   Result Value Ref Range    WBC 6.8 4.0 - 11.0 10e9/L    RBC Count 4.76 4.4 - 5.9 10e12/L    Hemoglobin 13.2 (L) 13.3 - 17.7 g/dL    Hematocrit 41.3 40.0 - 53.0 %    MCV 87 78 - 100 fl    MCH 27.7 26.5 - 33.0 pg    MCHC 32.0 31.5 - 36.5 " g/dL    RDW 15.7 (H) 10.0 - 15.0 %    Platelet Count 247 150 - 450 10e9/L    Diff Method Automated Method     % Neutrophils 66.3 %    % Lymphocytes 23.0 %    % Monocytes 7.5 %    % Eosinophils 1.3 %    % Basophils 0.7 %    % Immature Granulocytes 1.2 %    Absolute Neutrophil 4.5 1.6 - 8.3 10e9/L    Absolute Lymphocytes 1.6 0.8 - 5.3 10e9/L    Absolute Monocytes 0.5 0.0 - 1.3 10e9/L    Absolute Eosinophils 0.1 0.0 - 0.7 10e9/L    Absolute Basophils 0.1 0.0 - 0.2 10e9/L    Abs Immature Granulocytes 0.1 0 - 0.4 10e9/L   INR   Result Value Ref Range    INR 2.28 (H) 0.86 - 1.14   Comprehensive metabolic panel   Result Value Ref Range    Sodium 136 134 - 144 mmol/L    Potassium 4.0 3.5 - 5.1 mmol/L    Chloride 104 98 - 107 mmol/L    Carbon Dioxide 23 21 - 31 mmol/L    Anion Gap 9 3 - 14 mmol/L    Glucose 150 (H) 70 - 105 mg/dL    Urea Nitrogen 19 7 - 25 mg/dL    Creatinine 0.97 0.70 - 1.30 mg/dL    GFR Estimate 75 >60 mL/min/[1.73_m2]    GFR Estimate If Black >90 >60 mL/min/[1.73_m2]    Calcium 9.1 8.6 - 10.3 mg/dL    Bilirubin Total 0.7 0.3 - 1.0 mg/dL    Albumin 3.9 3.5 - 5.7 g/dL    Protein Total 6.8 6.4 - 8.9 g/dL    Alkaline Phosphatase 55 34 - 104 U/L    ALT 14 7 - 52 U/L    AST 17 13 - 39 U/L   Lipase   Result Value Ref Range    Lipase 5 (L) 11 - 82 U/L   Troponin GH   Result Value Ref Range    Troponin 10.0 <34.0 pg/mL   Nt probnp inpatient (BNP)   Result Value Ref Range    N-Terminal Pro BNP Inpatient 166 (H) 0 - 100 pg/mL   XR Chest Port 1 View    Narrative    Procedure:XR CHEST PORT 1 VW    Clinical history:Male, 80 years, sob    Technique: Single view was obtained.    Comparison: 1/29/2018    Findings: The cardiac silhouette is normal. The pulmonary vasculature  is within normal limits..    The lungs appear to be clear. Costophrenic angles are sharp. Bony  structures are unremarkable.      Impression    Impression:   No acute abnormality. No evidence of acute or active cardiopulmonary  disease.    HUNTER  MD SHARI   CT Abdomen Pelvis w Contrast    Narrative    CT ABDOMEN PELVIS W CONTRAST    CLINICAL HISTORY: Male, age 80 years,  Abd distension;    Comparison:  CT scan of the chest 1/29/2018    TECHNIQUE:  CT was performed of the abdomen and pelvis with IV  contrast. Sagittal, coronal, axial and 3-D MIP reconstructions were  reviewed.     FINDINGS:  Lung bases: Mosaic attenuation appears to related to peripheral areas  of atelectasis in both lungs. Visualized portions of the heart  demonstrate coronary artery calcifications and calcifications within  the aortic valve.    Liver: There is slight heterogeneity in enhancement, most notable in  the anterior aspect of the right lobe. Portal venous system appears  grossly normal.    Gallbladder: Surgically absent. Biliary tree is normal. Spleen:  Normal.    Pancreas: Normal.    Adrenal glands: Normal    Kidneys: Low dense foci again seen suggesting cortical cysts.    Ureters: Normal.    Urinary bladder: Normal.    Vasculature: Scattered atherosclerotic calcifications again seen  throughout the abdominal aorta. Inferior vena cava is normal. The  large and small bowel: Mild diverticulosis of the colon. No evidence  of diverticulitis or acute inflammatory process.    Appendix: Not reliably identified. There are no secondary signs of  appendicitis.    Lymph nodes: Normal.    Peritoneum: No evidence of free air or free fluid.    Bony structures: Ankylosis of the SI joints. Mild degenerative changes  of the lumbar spine.    Inguinal lymph nodes are normal. Mild diastases of the rectus  abdominis muscles. No evidence of well-defined ventral wall hernia.      Impression    IMPRESSION:   No evidence of acute abnormality of the abdomen/pelvis.    Chronic changes as described above. Heterogeneous enhancement of the  liver is suggestive of fatty infiltration.    HUNTER MCCARTHY MD       Medications   iohexol (OMNIPAQUE) 350 mg/mL solution 100 mL ( Intravenous Canceled Entry 12/2/20  1406)   iohexol (OMNIPAQUE) 350 mg/mL solution 100 mL (100 mLs Intravenous Given 12/2/20 1425)       Assessments & Plan (with Medical Decision Making)     I have reviewed the nursing notes.    I have reviewed the findings, diagnosis, plan and need for follow up with the patient.  Differential diagnosis includes but not limited to ACS, CHF, pneumonia, PE, myocarditis/pericarditis.  Given clinical exam, mildly elevated BNP and review of previous echocardiogram this is most consistent with a subacute exacerbation of CHF.  He has not been tried on any diuretics so we will start him on Lasix and potassium concurrently.  Recommend he follow-up closely with his [place] primary care.    Current Discharge Medication List      START taking these medications    Details   furosemide (LASIX) 20 MG tablet Take 1 tablet (20 mg) by mouth 2 times daily  Qty: 60 tablet, Refills: 1      potassium chloride ER (KLOR-CON M) 10 MEQ CR tablet Take 1 tablet (10 mEq) by mouth 2 times daily  Qty: 60 tablet, Refills: 1             Final diagnoses:   Diastolic dysfunction       12/2/2020   [facility]  12/02/20 9724

## 2020-12-02 NOTE — PROGRESS NOTES
IV Contrast- Discharge Instructions After Your CT Scan      The IV contrast you received today will be filtered from your bloodstream by your kidneys during the next 24 hours and pass from the body in urine.  You will not be aware of this process and your urine will not change in color.  To help this process you should drink at least 4 additional glasses of water or juice today.  This reduces stress on your kidneys.    Most contrast reactions are immediate.  Should you develop symptoms of concern after discharge, contact the department at the number below.  After hours you should contact your personal physician.  If you develop breathing distress or wheezing, call 911.      1.  Has the patient had a previous reaction to IV contrast? no    2.  Does the patient have kidney disease? no    3.  Is the patient on dialysis? no    If YES to any of these questions, exam will be reviewed with a Radiologist before administering contrast.  GFR Estimate   Date Value Ref Range Status   12/02/2020 75 >60 mL/min/[1.73_m2] Final     GFR Estimate If Black   Date Value Ref Range Status   12/02/2020 >90 >60 mL/min/[1.73_m2] Final

## 2020-12-02 NOTE — ED TRIAGE NOTES
"ED Nursing Triage Note (General)   ________________________________    Dieter YASMANY Das is a 80 year old Male that presents to triage private car  With history of  Bilateral feet and legs swelling as well as distended abdomen. Patient states that he has gained about 25lbs over the past 2 months with increased work of breathing. C/o SOB and inability to do anything.     BP (!) 147/53   Pulse 72   Temp 97.5  F (36.4  C) (Tympanic)   Resp 26   Ht 1.676 m (5' 6\")   Wt 103.9 kg (229 lb)   SpO2 98%   BMI 36.96 kg/m  t  Patient appears alert  and oriented, in mild distress., and cooperative and calm behavior.    GCS 15  Airway: intact  Breathing noted as Abnormal.  Circulation Abnormal  Skin lesion to lower legs         PRE HOSPITAL PRIOR LIVING SITUATION Alone in his own house with a nurse that comes to do his medications Q 2 weeks.   "

## 2020-12-02 NOTE — ED AVS SNAPSHOT
[facility]  [address]  [place] [address]  Phone: [phone]  Fax: [phone]                                    Jonatan Das   MRN: [id number]    Department: [facility]   Date of Visit: 12/2/2020           After Visit Summary Signature Page    I have received my discharge instructions, and my questions have been answered. I have discussed any challenges I see with this plan with the nurse or doctor.    ..........................................................................................................................................  Patient/Patient Representative Signature      ..........................................................................................................................................  Patient Representative Print Name and Relationship to Patient    ..................................................               ................................................  Date                                   Time    ..........................................................................................................................................  Reviewed by Signature/Title    ...................................................              ..............................................  Date                                               Time          22EPIC Rev 08/18

## 2020-12-02 NOTE — TELEPHONE ENCOUNTER
potassium chloride ER (KLOR-CON M) 10 MEQ CR tablet 60 tablet 1 12/2/2020  No   Sig - Route: Take 1 tablet (10 mEq) by mouth 2 times daily      Just ordered today in ER visit patient will need follow up with Schoenecker, Miranda M for refills  Ines Marinelli RN on 12/2/2020 at 3:52 PM

## 2021-01-30 ENCOUNTER — APPOINTMENT (OUTPATIENT)
Dept: GENERAL RADIOLOGY | Facility: OTHER | Age: 81
End: 2021-01-30
Attending: EMERGENCY MEDICINE
Payer: COMMERCIAL

## 2021-01-30 ENCOUNTER — HOSPITAL ENCOUNTER (EMERGENCY)
Facility: OTHER | Age: 81
Discharge: HOME | End: 2021-01-30
Attending: EMERGENCY MEDICINE | Admitting: EMERGENCY MEDICINE
Payer: COMMERCIAL

## 2021-01-30 VITALS
OXYGEN SATURATION: 95 % | WEIGHT: 216 LBS | BODY MASS INDEX: 34.72 KG/M2 | DIASTOLIC BLOOD PRESSURE: 64 MMHG | TEMPERATURE: 97.2 F | SYSTOLIC BLOOD PRESSURE: 107 MMHG | HEART RATE: 66 BPM | HEIGHT: 66 IN | RESPIRATION RATE: 25 BRPM

## 2021-01-30 DIAGNOSIS — R07.89 ATYPICAL CHEST PAIN: ICD-10-CM

## 2021-01-30 LAB
ALBUMIN SERPL-MCNC: 4 G/DL (ref 3.5–5.7)
ALP SERPL-CCNC: 78 U/L (ref 34–104)
ALT SERPL W P-5'-P-CCNC: 18 U/L (ref 7–52)
ANION GAP SERPL CALCULATED.3IONS-SCNC: 8 MMOL/L (ref 3–14)
AST SERPL W P-5'-P-CCNC: 17 U/L (ref 13–39)
BASOPHILS # BLD AUTO: 0.1 10E9/L (ref 0–0.2)
BASOPHILS NFR BLD AUTO: 0.6 %
BILIRUB SERPL-MCNC: 0.7 MG/DL (ref 0.3–1)
BUN SERPL-MCNC: 15 MG/DL (ref 7–25)
CALCIUM SERPL-MCNC: 9.5 MG/DL (ref 8.6–10.3)
CHLORIDE SERPL-SCNC: 100 MMOL/L (ref 98–107)
CO2 SERPL-SCNC: 27 MMOL/L (ref 21–31)
CREAT SERPL-MCNC: 1.03 MG/DL (ref 0.7–1.3)
D DIMER PPP FEU-MCNC: 0.3 UG/ML FEU (ref 0–0.5)
DIFFERENTIAL METHOD BLD: NORMAL
EOSINOPHIL # BLD AUTO: 0.1 10E9/L (ref 0–0.7)
EOSINOPHIL NFR BLD AUTO: 0.7 %
ERYTHROCYTE [DISTWIDTH] IN BLOOD BY AUTOMATED COUNT: 15 % (ref 10–15)
GFR SERPL CREATININE-BSD FRML MDRD: 70 ML/MIN/{1.73_M2}
GLUCOSE SERPL-MCNC: 268 MG/DL (ref 70–105)
HCT VFR BLD AUTO: 49.9 % (ref 40–53)
HGB BLD-MCNC: 16.1 G/DL (ref 13.3–17.7)
IMM GRANULOCYTES # BLD: 0.1 10E9/L (ref 0–0.4)
IMM GRANULOCYTES NFR BLD: 0.7 %
LYMPHOCYTES # BLD AUTO: 1.8 10E9/L (ref 0.8–5.3)
LYMPHOCYTES NFR BLD AUTO: 21 %
MCH RBC QN AUTO: 28 PG (ref 26.5–33)
MCHC RBC AUTO-ENTMCNC: 32.3 G/DL (ref 31.5–36.5)
MCV RBC AUTO: 87 FL (ref 78–100)
MONOCYTES # BLD AUTO: 0.5 10E9/L (ref 0–1.3)
MONOCYTES NFR BLD AUTO: 6.4 %
NEUTROPHILS # BLD AUTO: 5.9 10E9/L (ref 1.6–8.3)
NEUTROPHILS NFR BLD AUTO: 70.6 %
NT-PROBNP SERPL-MCNC: 119 PG/ML (ref 0–100)
PLATELET # BLD AUTO: 263 10E9/L (ref 150–450)
POTASSIUM SERPL-SCNC: 4 MMOL/L (ref 3.5–5.1)
PROT SERPL-MCNC: 7.2 G/DL (ref 6.4–8.9)
RBC # BLD AUTO: 5.74 10E12/L (ref 4.4–5.9)
SODIUM SERPL-SCNC: 135 MMOL/L (ref 134–144)
TROPONIN I SERPL-MCNC: 10 PG/ML
TROPONIN I SERPL-MCNC: 10.2 PG/ML
WBC # BLD AUTO: 8.4 10E9/L (ref 4–11)

## 2021-01-30 PROCEDURE — 36415 COLL VENOUS BLD VENIPUNCTURE: CPT | Performed by: EMERGENCY MEDICINE

## 2021-01-30 PROCEDURE — 99283 EMERGENCY DEPT VISIT LOW MDM: CPT | Performed by: EMERGENCY MEDICINE

## 2021-01-30 PROCEDURE — 83880 ASSAY OF NATRIURETIC PEPTIDE: CPT | Performed by: EMERGENCY MEDICINE

## 2021-01-30 PROCEDURE — 93010 ELECTROCARDIOGRAM REPORT: CPT | Performed by: INTERNAL MEDICINE

## 2021-01-30 PROCEDURE — 99285 EMERGENCY DEPT VISIT HI MDM: CPT | Mod: 25 | Performed by: EMERGENCY MEDICINE

## 2021-01-30 PROCEDURE — 71045 X-RAY EXAM CHEST 1 VIEW: CPT

## 2021-01-30 PROCEDURE — 250N000013 HC RX MED GY IP 250 OP 250 PS 637: Performed by: EMERGENCY MEDICINE

## 2021-01-30 PROCEDURE — 93005 ELECTROCARDIOGRAM TRACING: CPT | Performed by: EMERGENCY MEDICINE

## 2021-01-30 PROCEDURE — 85379 FIBRIN DEGRADATION QUANT: CPT | Performed by: EMERGENCY MEDICINE

## 2021-01-30 PROCEDURE — 84484 ASSAY OF TROPONIN QUANT: CPT | Performed by: EMERGENCY MEDICINE

## 2021-01-30 PROCEDURE — 85025 COMPLETE CBC W/AUTO DIFF WBC: CPT | Performed by: EMERGENCY MEDICINE

## 2021-01-30 PROCEDURE — 80053 COMPREHEN METABOLIC PANEL: CPT | Performed by: EMERGENCY MEDICINE

## 2021-01-30 RX ORDER — TAMSULOSIN HYDROCHLORIDE 0.4 MG/1
0.4 CAPSULE ORAL
COMMUNITY
Start: 2019-09-18

## 2021-01-30 RX ORDER — NITROGLYCERIN 0.4 MG/1
0.4 TABLET SUBLINGUAL
Status: DISCONTINUED | OUTPATIENT
Start: 2021-01-30 | End: 2021-01-30 | Stop reason: HOSPADM

## 2021-01-30 RX ORDER — POTASSIUM CHLORIDE 750 MG/1
TABLET, EXTENDED RELEASE ORAL
COMMUNITY
Start: 2020-12-02

## 2021-01-30 RX ORDER — GABAPENTIN 300 MG/1
CAPSULE ORAL
COMMUNITY
Start: 2019-09-07

## 2021-01-30 RX ORDER — TRIAMCINOLONE ACETONIDE 1 MG/G
CREAM TOPICAL
COMMUNITY
Start: 2019-09-18

## 2021-01-30 RX ORDER — ONDANSETRON 2 MG/ML
4 INJECTION INTRAMUSCULAR; INTRAVENOUS EVERY 30 MIN PRN
Status: DISCONTINUED | OUTPATIENT
Start: 2021-01-30 | End: 2021-01-30 | Stop reason: HOSPADM

## 2021-01-30 RX ORDER — ASPIRIN 81 MG/1
324 TABLET, CHEWABLE ORAL ONCE
Status: COMPLETED | OUTPATIENT
Start: 2021-01-30 | End: 2021-01-30

## 2021-01-30 RX ORDER — NITROGLYCERIN 0.4 MG/1
TABLET SUBLINGUAL
COMMUNITY
Start: 2019-07-11

## 2021-01-30 RX ADMIN — ASPIRIN 81 MG CHEWABLE TABLET 324 MG: 81 TABLET CHEWABLE at 13:25

## 2021-01-30 ASSESSMENT — ENCOUNTER SYMPTOMS
VOMITING: 0
CHEST TIGHTNESS: 0
SHORTNESS OF BREATH: 0
FEVER: 0
CHILLS: 0
AGITATION: 0
LIGHT-HEADEDNESS: 0
DYSURIA: 0
NAUSEA: 0
ARTHRALGIAS: 0

## 2021-01-30 ASSESSMENT — MIFFLIN-ST. JEOR: SCORE: 1642.52

## 2021-01-30 NOTE — ED PROVIDER NOTES
"  History     Chief Complaint   Patient presents with     Chest Pain     HPI  Jonatan Das is a 80 year old male who comes in by ambulance complaining of chest pain.  He states it began last night when he was doing some work around the house.  He has persisted until this morning it is not gone away.  He says it is a pressure and a tightness in the left anterior chest.  No radiation.  May be a little worse with deep breaths but nothing else seems to make it worse.  No diaphoresis nausea palpitations.  Not feeling short of breath.  No fevers or chills or other recent illnesses.    Allergies:  Allergies   Allergen Reactions     Tetanus Immune Globulin Other (See Comments) and Unknown     Patient states he \"went out like a light\" and \"fell on the floor\" after a tetanus shot.     Tetanus Toxoids        Problem List:    Patient Active Problem List    Diagnosis Date Noted     History of stroke 05/24/2015     Priority: Medium     Aspiration pneumonia (H) 05/24/2015     Priority: Medium     Diabetes mellitus, type 2 (H) 05/24/2015     Priority: Medium     S/P appendectomy 05/24/2015     Priority: Medium     S/P cholecystectomy 05/24/2015     Priority: Medium     Mixed hyperlipidemia 05/24/2015     Priority: Medium     Flu 12/22/2014     Priority: Medium        Past Medical History:    Past Medical History:   Diagnosis Date     Arthritis      CVA (cerebral infarction)      Diabetes (H)        Past Surgical History:    Past Surgical History:   Procedure Laterality Date     CHOLECYSTECTOMY       GENITOURINARY SURGERY       ORTHOPEDIC SURGERY      knee surgery       Family History:    History reviewed. No pertinent family history.    Social History:  Marital Status:   [2]  Social History     Tobacco Use     Smoking status: Never Smoker     Smokeless tobacco: Never Used   Substance Use Topics     Alcohol use: No     Drug use: No        Medications:         gabapentin (NEURONTIN) 300 MG capsule       methotrexate 2.5 MG " "tablet       nitroGLYcerin (NITROSTAT) 0.4 MG sublingual tablet       ranitidine (ZANTAC) 150 MG tablet       rivaroxaban ANTICOAGULANT (XARELTO ANTICOAGULANT) 20 MG TABS tablet       tamsulosin (FLOMAX) 0.4 MG capsule       triamcinolone (KENALOG) 0.1 % external cream       acetaminophen (TYLENOL) 325 MG tablet       acetaminophen-codeine (TYLENOL #3) 300-30 MG per tablet       ASPIRIN PO       ATORVASTATIN CALCIUM PO       furosemide (LASIX) 20 MG tablet       insulin glargine (LANTUS VIAL) 100 UNIT/ML soln       LISINOPRIL PO       METFORMIN HCL PO       metoprolol succinate (TOPROL-XL) 25 MG 24 hr tablet       potassium chloride ER (K-TAB/KLOR-CON) 10 MEQ CR tablet       potassium chloride ER (KLOR-CON M) 10 MEQ CR tablet          Review of Systems   Constitutional: Negative for chills and fever.   HENT: Negative for congestion.    Eyes: Negative for visual disturbance.   Respiratory: Negative for chest tightness and shortness of breath.    Cardiovascular: Positive for chest pain.   Gastrointestinal: Negative for nausea and vomiting.   Genitourinary: Negative for dysuria.   Musculoskeletal: Negative for arthralgias.   Skin: Negative for rash.   Neurological: Negative for light-headedness.   Psychiatric/Behavioral: Negative for agitation.       Physical Exam   BP: 131/88  Pulse: 72  Temp: 97.2  F (36.2  C)  Resp: 20  Height: 167.6 cm (5' 6\")  Weight: 98 kg (216 lb)  SpO2: 98 %      Physical Exam  Vitals signs and nursing note reviewed.   Constitutional:       Appearance: He is well-developed.   HENT:      Head: Normocephalic and atraumatic.      Mouth/Throat:      Mouth: Mucous membranes are moist.   Eyes:      Conjunctiva/sclera: Conjunctivae normal.   Cardiovascular:      Rate and Rhythm: Normal rate and regular rhythm.      Heart sounds: Normal heart sounds.   Pulmonary:      Effort: Pulmonary effort is normal.      Breath sounds: Normal breath sounds.   Chest:      Chest wall: No tenderness.   Abdominal:      " General: Bowel sounds are normal. There is no distension.      Palpations: Abdomen is soft.      Tenderness: There is no abdominal tenderness.   Skin:     General: Skin is warm and dry.   Neurological:      Mental Status: He is alert and oriented to person, place, and time.   Psychiatric:         Behavior: Behavior normal.         ED Course        Procedures               EKG shows atrial fibrillation rate of 76 bpm.  There is some T wave inversion laterally.  I do not see any ST segment elevation.           Results for orders placed or performed during the hospital encounter of 01/30/21 (from the past 24 hour(s))   CBC with platelets differential   Result Value Ref Range    WBC 8.4 4.0 - 11.0 10e9/L    RBC Count 5.74 4.4 - 5.9 10e12/L    Hemoglobin 16.1 13.3 - 17.7 g/dL    Hematocrit 49.9 40.0 - 53.0 %    MCV 87 78 - 100 fl    MCH 28.0 26.5 - 33.0 pg    MCHC 32.3 31.5 - 36.5 g/dL    RDW 15.0 10.0 - 15.0 %    Platelet Count 263 150 - 450 10e9/L    Diff Method Automated Method     % Neutrophils 70.6 %    % Lymphocytes 21.0 %    % Monocytes 6.4 %    % Eosinophils 0.7 %    % Basophils 0.6 %    % Immature Granulocytes 0.7 %    Absolute Neutrophil 5.9 1.6 - 8.3 10e9/L    Absolute Lymphocytes 1.8 0.8 - 5.3 10e9/L    Absolute Monocytes 0.5 0.0 - 1.3 10e9/L    Absolute Eosinophils 0.1 0.0 - 0.7 10e9/L    Absolute Basophils 0.1 0.0 - 0.2 10e9/L    Abs Immature Granulocytes 0.1 0 - 0.4 10e9/L   Troponin GH   Result Value Ref Range    Troponin 10.2 <34.0 pg/mL   Comprehensive metabolic panel   Result Value Ref Range    Sodium 135 134 - 144 mmol/L    Potassium 4.0 3.5 - 5.1 mmol/L    Chloride 100 98 - 107 mmol/L    Carbon Dioxide 27 21 - 31 mmol/L    Anion Gap 8 3 - 14 mmol/L    Glucose 268 (H) 70 - 105 mg/dL    Urea Nitrogen 15 7 - 25 mg/dL    Creatinine 1.03 0.70 - 1.30 mg/dL    GFR Estimate 70 >60 mL/min/[1.73_m2]    GFR Estimate If Black 85 >60 mL/min/[1.73_m2]    Calcium 9.5 8.6 - 10.3 mg/dL    Bilirubin Total 0.7 0.3 -  1.0 mg/dL    Albumin 4.0 3.5 - 5.7 g/dL    Protein Total 7.2 6.4 - 8.9 g/dL    Alkaline Phosphatase 78 34 - 104 U/L    ALT 18 7 - 52 U/L    AST 17 13 - 39 U/L   Nt probnp inpatient (BNP)   Result Value Ref Range    N-Terminal Pro BNP Inpatient 119 (H) 0 - 100 pg/mL   XR Chest Port 1 View    Narrative    Procedure:XR CHEST PORT 1 VW    Clinical history:Male, 80 years, chest pain    Technique: Single view was obtained.    Comparison: 12/2/2020    Findings: The cardiac silhouette is normal. The pulmonary vasculature  is normal.    The lungs are clear. Bony structures are unremarkable.      Impression    Impression:   No acute abnormality. No evidence of acute or active cardiopulmonary  disease.    HUNTER MCCARTHY MD   D dimer quantitative   Result Value Ref Range    D Dimer 0.3 0.0 - 0.50 ug/ml FEU   Troponin GH   Result Value Ref Range    Troponin 10.0 <34.0 pg/mL       Medications   nitroGLYcerin (NITROSTAT) sublingual tablet 0.4 mg (has no administration in time range)   ondansetron (ZOFRAN) injection 4 mg (has no administration in time range)   aspirin (ASA) chewable tablet 324 mg (324 mg Oral Given 1/30/21 1325)       Assessments & Plan (with Medical Decision Making)     I have reviewed the nursing notes.    I have reviewed the findings, diagnosis, plan and need for follow up with the patient.  I see no evidence for any type of acute cardiac etiology to explain his symptoms.  Serial troponins and D-dimer are negative.  Chest x-ray reassuring as well.  His pain has resolved.  He will be discharged home at this time.  Follow-up in clinic, return here if worse.    New Prescriptions    No medications on file       Final diagnoses:   Atypical chest pain       1/30/2021   [facility] AND \[facility]\""     Martin John MD  01/30/21 6558

## 2021-01-30 NOTE — ED TRIAGE NOTES
Pt to ER from home via EMS with c/o left chest pain.  Felt it t/o out night, self administered nitroglycerin x 2 at 0900.  Pain 7/10 en route. Pain worse with deep breathing, reproducible.

## 2021-02-02 LAB — INTERPRETATION ECG - MUSE: NORMAL

## 2021-05-07 ENCOUNTER — APPOINTMENT (OUTPATIENT)
Dept: CT IMAGING | Facility: OTHER | Age: 81
End: 2021-05-07
Attending: STUDENT IN AN ORGANIZED HEALTH CARE EDUCATION/TRAINING PROGRAM
Payer: COMMERCIAL

## 2021-05-07 ENCOUNTER — HOSPITAL ENCOUNTER (EMERGENCY)
Facility: OTHER | Age: 81
Discharge: HOME | End: 2021-05-07
Attending: STUDENT IN AN ORGANIZED HEALTH CARE EDUCATION/TRAINING PROGRAM | Admitting: STUDENT IN AN ORGANIZED HEALTH CARE EDUCATION/TRAINING PROGRAM
Payer: COMMERCIAL

## 2021-05-07 VITALS
BODY MASS INDEX: 32.78 KG/M2 | OXYGEN SATURATION: 96 % | DIASTOLIC BLOOD PRESSURE: 105 MMHG | TEMPERATURE: 97.5 F | WEIGHT: 204 LBS | SYSTOLIC BLOOD PRESSURE: 137 MMHG | HEART RATE: 81 BPM | RESPIRATION RATE: 18 BRPM | HEIGHT: 66 IN

## 2021-05-07 DIAGNOSIS — R25.1 SHAKES: ICD-10-CM

## 2021-05-07 LAB
ALBUMIN SERPL-MCNC: 4.5 G/DL (ref 3.5–5.7)
ALBUMIN UR-MCNC: NEGATIVE MG/DL
ALP SERPL-CCNC: 82 U/L (ref 34–104)
ALT SERPL W P-5'-P-CCNC: 19 U/L (ref 7–52)
ANION GAP SERPL CALCULATED.3IONS-SCNC: 7 MMOL/L (ref 3–14)
APPEARANCE UR: CLEAR
AST SERPL W P-5'-P-CCNC: 18 U/L (ref 13–39)
BASOPHILS # BLD AUTO: 0.1 10E9/L (ref 0–0.2)
BASOPHILS NFR BLD AUTO: 0.5 %
BILIRUB SERPL-MCNC: 0.7 MG/DL (ref 0.3–1)
BILIRUB UR QL STRIP: NEGATIVE
BUN SERPL-MCNC: 14 MG/DL (ref 7–25)
CALCIUM SERPL-MCNC: 10.1 MG/DL (ref 8.6–10.3)
CHLORIDE SERPL-SCNC: 99 MMOL/L (ref 98–107)
CO2 SERPL-SCNC: 28 MMOL/L (ref 21–31)
COLOR UR AUTO: YELLOW
CREAT SERPL-MCNC: 0.89 MG/DL (ref 0.7–1.3)
DIFFERENTIAL METHOD BLD: ABNORMAL
EOSINOPHIL # BLD AUTO: 0.1 10E9/L (ref 0–0.7)
EOSINOPHIL NFR BLD AUTO: 0.6 %
ERYTHROCYTE [DISTWIDTH] IN BLOOD BY AUTOMATED COUNT: 15.3 % (ref 10–15)
GFR SERPL CREATININE-BSD FRML MDRD: 83 ML/MIN/{1.73_M2}
GLUCOSE SERPL-MCNC: 137 MG/DL (ref 70–105)
GLUCOSE UR STRIP-MCNC: >1000 MG/DL
HCT VFR BLD AUTO: 54.2 % (ref 40–53)
HGB BLD-MCNC: 17.7 G/DL (ref 13.3–17.7)
HGB UR QL STRIP: NEGATIVE
IMM GRANULOCYTES # BLD: 0.1 10E9/L (ref 0–0.4)
IMM GRANULOCYTES NFR BLD: 0.8 %
KETONES UR STRIP-MCNC: NEGATIVE MG/DL
LEUKOCYTE ESTERASE UR QL STRIP: NEGATIVE
LYMPHOCYTES # BLD AUTO: 2.2 10E9/L (ref 0.8–5.3)
LYMPHOCYTES NFR BLD AUTO: 23.9 %
MAGNESIUM SERPL-MCNC: 2.1 MG/DL (ref 1.9–2.7)
MCH RBC QN AUTO: 27.8 PG (ref 26.5–33)
MCHC RBC AUTO-ENTMCNC: 32.7 G/DL (ref 31.5–36.5)
MCV RBC AUTO: 85 FL (ref 78–100)
MONOCYTES # BLD AUTO: 0.6 10E9/L (ref 0–1.3)
MONOCYTES NFR BLD AUTO: 5.9 %
NEUTROPHILS # BLD AUTO: 6.4 10E9/L (ref 1.6–8.3)
NEUTROPHILS NFR BLD AUTO: 68.3 %
NITRATE UR QL: NEGATIVE
PH UR STRIP: 5 PH (ref 5–7)
PLATELET # BLD AUTO: 257 10E9/L (ref 150–450)
POTASSIUM SERPL-SCNC: 4.3 MMOL/L (ref 3.5–5.1)
PROT SERPL-MCNC: 7.9 G/DL (ref 6.4–8.9)
RBC # BLD AUTO: 6.37 10E12/L (ref 4.4–5.9)
SODIUM SERPL-SCNC: 134 MMOL/L (ref 134–144)
SOURCE: ABNORMAL
SP GR UR STRIP: 1.01 (ref 1–1.03)
UROBILINOGEN UR STRIP-MCNC: NORMAL MG/DL (ref 0–2)
WBC # BLD AUTO: 9.3 10E9/L (ref 4–11)

## 2021-05-07 PROCEDURE — 83735 ASSAY OF MAGNESIUM: CPT | Performed by: STUDENT IN AN ORGANIZED HEALTH CARE EDUCATION/TRAINING PROGRAM

## 2021-05-07 PROCEDURE — 70450 CT HEAD/BRAIN W/O DYE: CPT

## 2021-05-07 PROCEDURE — 81003 URINALYSIS AUTO W/O SCOPE: CPT | Performed by: STUDENT IN AN ORGANIZED HEALTH CARE EDUCATION/TRAINING PROGRAM

## 2021-05-07 PROCEDURE — 93010 ELECTROCARDIOGRAM REPORT: CPT | Performed by: INTERNAL MEDICINE

## 2021-05-07 PROCEDURE — 85025 COMPLETE CBC W/AUTO DIFF WBC: CPT | Performed by: STUDENT IN AN ORGANIZED HEALTH CARE EDUCATION/TRAINING PROGRAM

## 2021-05-07 PROCEDURE — 93005 ELECTROCARDIOGRAM TRACING: CPT | Performed by: STUDENT IN AN ORGANIZED HEALTH CARE EDUCATION/TRAINING PROGRAM

## 2021-05-07 PROCEDURE — 80053 COMPREHEN METABOLIC PANEL: CPT | Performed by: STUDENT IN AN ORGANIZED HEALTH CARE EDUCATION/TRAINING PROGRAM

## 2021-05-07 PROCEDURE — 99285 EMERGENCY DEPT VISIT HI MDM: CPT | Mod: 25 | Performed by: STUDENT IN AN ORGANIZED HEALTH CARE EDUCATION/TRAINING PROGRAM

## 2021-05-07 PROCEDURE — 99284 EMERGENCY DEPT VISIT MOD MDM: CPT | Performed by: STUDENT IN AN ORGANIZED HEALTH CARE EDUCATION/TRAINING PROGRAM

## 2021-05-07 PROCEDURE — 36415 COLL VENOUS BLD VENIPUNCTURE: CPT | Performed by: STUDENT IN AN ORGANIZED HEALTH CARE EDUCATION/TRAINING PROGRAM

## 2021-05-07 ASSESSMENT — MIFFLIN-ST. JEOR: SCORE: 1588.09

## 2021-05-07 NOTE — DISCHARGE INSTRUCTIONS
No dangerous or life-threatening causes of your shaking episodes was discovered today.    Continue taking all of your prescribed medications.    Keep follow-up with your doctor in clinic in next week on Tuesday, 5/11; discuss a referral to neurology for further evaluation.    Come back to the emergency department if worsening shaking spells, especially if associated with new headache, vomiting, or loss of consciousness, or fevers, or any other acute concerns.

## 2021-05-07 NOTE — ED TRIAGE NOTES
"ED Nursing Triage Note (General)   ________________________________    Dieter YASMANY Das is a 80 year old Male that presents to triage private car  With history of  Feeling shaky, will have random spells of full body shakes. They dont stop and no control when they come on, none noted while in triage. Patient using cane and states son drove him here today reported by patient   Significant symptoms had onset 2 day(s) ago.  /68   Pulse 69   Temp 97.5  F (36.4  C) (Temporal)   Resp 18   Ht 1.676 m (5' 6\")   Wt 92.5 kg (204 lb)   SpO2 95%   BMI 32.93 kg/m  t  Patient appears alert  and oriented, in no acute distress., and cooperative and calm behavior.  GCS Total = 15  Airway: intact  Breathing noted as Normal.  Circulation Normal  Skin normal, warm  Action taken: Brought to waiting room and awaiting room.      PRE HOSPITAL PRIOR LIVING SITUATION Alone  "

## 2021-05-07 NOTE — ED NOTES
Home care nurse from [facility] called to give report on pt, states that pt started getting upper body tremors for the last two days. Call home care at [facility] for any questions, ext [phone].

## 2021-05-07 NOTE — ED PROVIDER NOTES
"  History     Chief Complaint   Patient presents with     Shaking     HPI  Jonatan Das is a 80 year old male with history of CVA remotely with no residual deficits per patient, type 2 diabetes, hyperlipidemia, A. fib on Xarelto who presents for evaluation of shaking episodes.  Patient reports that he started having shaking episodes 2 days ago, he reports bilateral symmetric upper extremity and to a lesser extent lower extremity shaking that comes on randomly at rest and happen several times per day.  He reports being fully awake and aware during these episodes, states it feels like he has just been out in the cold and is having shivers.  He states these episodes last for seconds to 1 to 2 minutes at a time before resolving completely.  He denies any associated symptoms with these, no fevers or chills, chest pain or difficulty breathing, headaches or vision changes or hearing changes.  Denies associated palpitations, abdominal pain, back pain.  He denies any numbness or tingling in his body during these episodes.  He denies any recent change in his thinking nor confusion.  He denies neck stiffness.  No recent trauma.  He asked his son to bring him in for evaluation today due to the symptoms.  He reports his blood sugars have been generally good.    Allergies:  Allergies   Allergen Reactions     Tetanus Immune Globulin Other (See Comments) and Unknown     Patient states he \"went out like a light\" and \"fell on the floor\" after a tetanus shot.     Tetanus Toxoids        Problem List:    Patient Active Problem List    Diagnosis Date Noted     History of stroke 05/24/2015     Priority: Medium     Aspiration pneumonia (H) 05/24/2015     Priority: Medium     Diabetes mellitus, type 2 (H) 05/24/2015     Priority: Medium     S/P appendectomy 05/24/2015     Priority: Medium     S/P cholecystectomy 05/24/2015     Priority: Medium     Mixed hyperlipidemia 05/24/2015     Priority: Medium     Flu 12/22/2014     Priority: " "Medium        Past Medical History:    Past Medical History:   Diagnosis Date     Arthritis      CVA (cerebral infarction)      Diabetes (H)        Past Surgical History:    Past Surgical History:   Procedure Laterality Date     CHOLECYSTECTOMY       GENITOURINARY SURGERY       ORTHOPEDIC SURGERY      knee surgery       Family History:    History reviewed. No pertinent family history.    Social History:  Marital Status:   [2]  Social History     Tobacco Use     Smoking status: Never Smoker     Smokeless tobacco: Never Used   Substance Use Topics     Alcohol use: No     Drug use: No        Medications:    acetaminophen (TYLENOL) 325 MG tablet  acetaminophen-codeine (TYLENOL #3) 300-30 MG per tablet  ASPIRIN PO  ATORVASTATIN CALCIUM PO  furosemide (LASIX) 20 MG tablet  gabapentin (NEURONTIN) 300 MG capsule  insulin glargine (LANTUS VIAL) 100 UNIT/ML soln  LISINOPRIL PO  METFORMIN HCL PO  methotrexate 2.5 MG tablet  metoprolol succinate (TOPROL-XL) 25 MG 24 hr tablet  nitroGLYcerin (NITROSTAT) 0.4 MG sublingual tablet  potassium chloride ER (K-TAB/KLOR-CON) 10 MEQ CR tablet  potassium chloride ER (KLOR-CON M) 10 MEQ CR tablet  ranitidine (ZANTAC) 150 MG tablet  rivaroxaban ANTICOAGULANT (XARELTO ANTICOAGULANT) 20 MG TABS tablet  tamsulosin (FLOMAX) 0.4 MG capsule  triamcinolone (KENALOG) 0.1 % external cream          Review of Systems  Please see HPI for pertinent positives and negatives.  All other systems reviewed and found to be negative.    Physical Exam   BP: 117/68  Pulse: 69  Temp: 97.5  F (36.4  C)  Resp: 18  Height: 167.6 cm (5' 6\")  Weight: 92.5 kg (204 lb)  SpO2: 95 %      Physical Exam  Gen: Sitting up in bed, no acute distress, alert  HEENT: NC/AT, MMM, conjunctivae clear  CV: Irregularly irregular rhythm, no murmurs, appears warm and well-perfused  Pulm: CTAB, normal respiratory effort  Abd: Soft, NT, ND  Skin: no rash or other lesions  MSK: no gross deformities or swelling  Neuro: A&O x4. Strength " intact and symmetric in all extremities. SILT. Coordination grossly intact, Romberg negative. 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 Gait normal with assist of cane. No tremor at rest. Very mild tremor with FNF, symmetric bilaterally.    ED Course     ED Course as of May 08 0702   Fri May 07, 2021   1301 Patient evaluated, plan for labs, EKG, CT head, will monitor in the emergency department but anticipate likely discharge with neurology and PCP referral, close outpatient follow-up and return precautions      1315 CBC unremarkable      1321 EKG reviewed, no STEMI or current of injury, a.fib, TWI in anterolateral leads are old compared to prior EKG from 1/2021      1356 Urinalysis unremarkable for infection      1403 CMP and magnesium within normal limits. Will re-evaluate patient      1404 CT head final report:  IMPRESSION: Volume loss and microvascular ischemic changes. No acute  intracranial hemorrhage.          Procedures               Critical Care time:  none               Results for orders placed or performed during the hospital encounter of 05/07/21 (from the past 24 hour(s))   CBC with platelets differential   Result Value Ref Range    WBC 9.3 4.0 - 11.0 10e9/L    RBC Count 6.37 (H) 4.4 - 5.9 10e12/L    Hemoglobin 17.7 13.3 - 17.7 g/dL    Hematocrit 54.2 (H) 40.0 - 53.0 %    MCV 85 78 - 100 fl    MCH 27.8 26.5 - 33.0 pg    MCHC 32.7 31.5 - 36.5 g/dL    RDW 15.3 (H) 10.0 - 15.0 %    Platelet Count 257 150 - 450 10e9/L    Diff Method Automated Method     % Neutrophils 68.3 %    % Lymphocytes 23.9 %    % Monocytes 5.9 %    % Eosinophils 0.6 %    % Basophils 0.5 %    % Immature Granulocytes 0.8 %    Absolute Neutrophil 6.4 1.6 - 8.3 10e9/L    Absolute Lymphocytes 2.2 0.8 - 5.3 10e9/L    Absolute Monocytes 0.6 0.0 - 1.3 10e9/L    Absolute Eosinophils 0.1 0.0 - 0.7 10e9/L    Absolute Basophils  0.1 0.0 - 0.2 10e9/L    Abs Immature Granulocytes 0.1 0 - 0.4 10e9/L   Comprehensive metabolic panel   Result Value Ref Range    Sodium 134 134 - 144 mmol/L    Potassium 4.3 3.5 - 5.1 mmol/L    Chloride 99 98 - 107 mmol/L    Carbon Dioxide 28 21 - 31 mmol/L    Anion Gap 7 3 - 14 mmol/L    Glucose 137 (H) 70 - 105 mg/dL    Urea Nitrogen 14 7 - 25 mg/dL    Creatinine 0.89 0.70 - 1.30 mg/dL    GFR Estimate 83 >60 mL/min/[1.73_m2]    GFR Estimate If Black >90 >60 mL/min/[1.73_m2]    Calcium 10.1 8.6 - 10.3 mg/dL    Bilirubin Total 0.7 0.3 - 1.0 mg/dL    Albumin 4.5 3.5 - 5.7 g/dL    Protein Total 7.9 6.4 - 8.9 g/dL    Alkaline Phosphatase 82 34 - 104 U/L    ALT 19 7 - 52 U/L    AST 18 13 - 39 U/L   Magnesium   Result Value Ref Range    Magnesium 2.1 1.9 - 2.7 mg/dL   CT Head w/o Contrast    Narrative    PROCEDURE: CT HEAD W/O CONTRAST     HISTORY: Seizure, nontraumatic (Age >= 41y).    COMPARISON: None.    TECHNIQUE:  Helical images of the head from the foramen magnum to the  vertex were obtained without contrast.    FINDINGS: The ventricles and sulci are prominent, compatible with  moderate to advanced, generalized volume loss. No acute intracranial  hemorrhage, mass effect, midline shift, hydrocephalus or basilar  cystern effacement are present.    No transcortical ischemia is identified. Patchy and confluent  hypoattenuation within the supratentorial subcortical and  periventricular white matter is most compatible with microvascular  ischemic change.     The calvarium is intact. The mastoid air cells are clear.  The  visualized paranasal sinuses are clear.      Impression    IMPRESSION: Volume loss and microvascular ischemic changes. No acute  intracranial hemorrhage.      BRANT DUGGAN MD   UA reflex to Microscopic   Result Value Ref Range    Color Urine Yellow     Appearance Urine Clear     Glucose Urine >1000 (A) NEG^Negative mg/dL    Bilirubin Urine Negative NEG^Negative    Ketones Urine Negative  NEG^Negative mg/dL    Specific Gravity Urine 1.013 1.003 - 1.035    Blood Urine Negative NEG^Negative    pH Urine 5.0 5.0 - 7.0 pH    Protein Albumin Urine Negative NEG^Negative mg/dL    Urobilinogen mg/dL Normal 0.0 - 2.0 mg/dL    Nitrite Urine Negative NEG^Negative    Leukocyte Esterase Urine Negative NEG^Negative    Source Midstream Urine        Medications - No data to display    Assessments & Plan (with Medical Decision Making)   80 year old male with history of CVA remotely with no residual deficits per patient, type 2 diabetes, hyperlipidemia, A. fib on Xarelto who presents for evaluation of shaking episodes, found to have unremarkable neurologic exam and no evidence of infection or metabolic abnormality that might be precipitating these episodes. Vitally stable, afebrile here. CT head obtained to rule out large mass lesion, this showed volume loss and microvascular ischemic changes but no hemorrhage or other acute pathology. Patient remained stable on recheck. Patient did have brief shivering episodes while I was in the room that was symmetric and bilateral, not rhythmic or idiosyncratic to suggest seizure and patient wholly alert and conversant during this episode.Discussed likely benign nature of symptoms, this could represent early Parkinson's or other neurologic cause and recommended close outpatient follow-up ([facility]) and discussion of neurology referral if these shivering episodes persist. I do not suspect stroke or malignancy. Patient appropriate for discharge with close outpatient follow-up, careful ED return precautions provided.    From ED discharge instructions:  No dangerous or life-threatening causes of your shaking episodes was discovered today.    Continue taking all of your prescribed medications.    Keep follow-up with your doctor in clinic in next week on Tuesday, 5/11; discuss a referral to neurology for further evaluation.    Come back to the emergency  department if worsening shaking spells, especially if associated with new headache, vomiting, or loss of consciousness, or fevers, or any other acute concerns.      I have reviewed the nursing notes.    I have reviewed the findings, diagnosis, plan and need for follow up with the patient.       Discharge Medication List as of 5/7/2021  2:16 PM          Final diagnoses:   Christopher       5/7/2021   [facility] Carlos Valiente MD  05/10/21 6501

## 2021-05-10 LAB — INTERPRETATION ECG - MUSE: NORMAL

## 2022-11-27 ENCOUNTER — HOSPITAL ENCOUNTER (EMERGENCY)
Facility: OTHER | Age: 82
Discharge: HOME | End: 2022-11-27
Attending: INTERNAL MEDICINE | Admitting: INTERNAL MEDICINE
Payer: COMMERCIAL

## 2022-11-27 VITALS
SYSTOLIC BLOOD PRESSURE: 124 MMHG | BODY MASS INDEX: 32.78 KG/M2 | DIASTOLIC BLOOD PRESSURE: 91 MMHG | TEMPERATURE: 99 F | WEIGHT: 204 LBS | HEART RATE: 104 BPM | HEIGHT: 66 IN | OXYGEN SATURATION: 98 % | RESPIRATION RATE: 16 BRPM

## 2022-11-27 DIAGNOSIS — R81 GLUCOSURIA: ICD-10-CM

## 2022-11-27 DIAGNOSIS — R35.0 URINARY FREQUENCY: ICD-10-CM

## 2022-11-27 LAB
ALBUMIN UR-MCNC: 50 MG/DL
APPEARANCE UR: CLEAR
BILIRUB UR QL STRIP: NEGATIVE
COLOR UR AUTO: ABNORMAL
GLUCOSE UR STRIP-MCNC: >1000 MG/DL
HGB UR QL STRIP: ABNORMAL
KETONES UR STRIP-MCNC: 100 MG/DL
LEUKOCYTE ESTERASE UR QL STRIP: NEGATIVE
MUCOUS THREADS #/AREA URNS LPF: PRESENT /LPF
NITRATE UR QL: NEGATIVE
PH UR STRIP: 5 [PH] (ref 5–9)
RBC URINE: 1 /HPF
SP GR UR STRIP: 1.03 (ref 1–1.03)
UROBILINOGEN UR STRIP-MCNC: NORMAL MG/DL
WBC URINE: <1 /HPF

## 2022-11-27 PROCEDURE — 99282 EMERGENCY DEPT VISIT SF MDM: CPT | Performed by: INTERNAL MEDICINE

## 2022-11-27 PROCEDURE — 81001 URINALYSIS AUTO W/SCOPE: CPT | Performed by: INTERNAL MEDICINE

## 2022-11-27 PROCEDURE — 99283 EMERGENCY DEPT VISIT LOW MDM: CPT | Performed by: INTERNAL MEDICINE

## 2022-11-27 ASSESSMENT — ACTIVITIES OF DAILY LIVING (ADL): ADLS_ACUITY_SCORE: 37

## 2022-11-27 NOTE — ED PROVIDER NOTES
Emergency Department Provider Note  : 1940 Age: 82 year old Sex: male MRN: [id number]    Chief Complaint   Patient presents with     Urinary Frequency       Medical Decision Making / Assessment / Plan   82 year old male presenting with urinary frequency, glucosuria    ED Course as of 22 1049   Sun 2022   1044 Patient evaluated.  He was worried he might have a bladder infection but those symptoms have resolved.  His low back pain has also resolved.  He does not feel like he is sick or ill or has infectious symptoms at this time.  Urinalysis obtained, no signs of infection.  Patient would like some juice and to discharge home.        The patient was informed of the plan and verbalized understanding and agreed with the plan. The patient was given strict return to Emergency Department precautions as well as appropriate follow up instructions. The patient was discharged in stable condition.    New Prescriptions    No medications on file       Final diagnoses:   Urinary frequency   Glucosuria       Ghassan Contreras MD  2022   Emergency Department    Subjective   Dieter is a 82 year old male who presents at  9:56 AM with urinary frequency that started up last evening.  Had a some associated low back pain bilaterally.  Reports that he was coughing quite a bit last evening and spitting up a lot of mucus almost constantly and had almost no bladder control last night.  States that he was up urinating all night long.  Denies fevers or chills.  No chest pain, no abdominal pain.  No rash.    At this time the cough has resolved.  He is feeling much better.  His back pain has resolved.  No longer having the urinary frequency symptoms.    Urinalysis obtained, no signs of infection were noted.    He is very thirsty and would like some juice, otherwise feels back to baseline and would like to discharge home.    I have reviewed the Medications, Allergies, Past Medical and Surgical History, and Social History  "in the Epic System and with family.    Review of Systems:  Please see Subjective / HPI for pertinent positives and negatives. All other systems reviewed and found to be negative.      Objective     Patient Vitals for the past 24 hrs:   BP Temp Temp src Pulse Resp SpO2 Height Weight   11/27/22 1004 (!) 124/91 99  F (37.2  C) Tympanic 104 16 98 % 1.676 m (5' 6\") 92.5 kg (204 lb)       Physical Exam:   General: Awake, alert, in no acute distress.  Head: Normocephalic, atraumatic.  Eyes: wears patch on left eye  ENT: slightly dry oral membranes, external ear appears normal.   Chest/Respiratory: Equal chest rise, clear bilaterally.  Cardiovascular: Peripheral pulses present, regular rate and rhythm.  Abdominal: Soft, non-distended, non-tender.  Extremities: No obvious deformity.  Neurological: GCS 15, moving all extremities without gross deficit.  Skin: Warm, no rashes, lesions, or bruising.   Psychiatric: Appropriate affect.     Procedures / Critical Care   Procedures    Aggregate Critical Care Time: None.     Orders Placed This Encounter   Procedures     UA reflex to Microscopic and Culture       RESULTS:  Results for orders placed or performed during the hospital encounter of 11/27/22   UA reflex to Microscopic and Culture     Status: Abnormal    Specimen: Urine, Midstream   Result Value Ref Range    Color Urine Light Yellow Colorless, Straw, Light Yellow, Yellow    Appearance Urine Clear Clear    Glucose Urine >1000 (A) Negative mg/dL    Bilirubin Urine Negative Negative    Ketones Urine 100 (A) Negative mg/dL    Specific Gravity Urine 1.027 1.000 - 1.030    Blood Urine Small (A) Negative    pH Urine 5.0 5.0 - 9.0    Protein Albumin Urine 50 (A) Negative mg/dL    Urobilinogen Urine Normal Normal, 2.0 mg/dL    Nitrite Urine Negative Negative    Leukocyte Esterase Urine Negative Negative    Mucus Urine Present (A) None Seen /LPF    RBC Urine 1 <=2 /HPF    WBC Urine <1 <=5 /HPF    Narrative    Urine Culture not " indicated             Medical/Surgical History:  Past Medical History:   Diagnosis Date     Arthritis      CVA (cerebral infarction)      Diabetes (H)      Past Surgical History:   Procedure Laterality Date     CHOLECYSTECTOMY       GENITOURINARY SURGERY       ORTHOPEDIC SURGERY      knee surgery       Medications:  No current facility-administered medications for this encounter.     Current Outpatient Medications   Medication     acetaminophen (TYLENOL) 325 MG tablet     acetaminophen-codeine (TYLENOL #3) 300-30 MG per tablet     ASPIRIN PO     ATORVASTATIN CALCIUM PO     furosemide (LASIX) 20 MG tablet     gabapentin (NEURONTIN) 300 MG capsule     insulin glargine (LANTUS VIAL) 100 UNIT/ML soln     LISINOPRIL PO     METFORMIN HCL PO     methotrexate 2.5 MG tablet     metoprolol succinate (TOPROL-XL) 25 MG 24 hr tablet     nitroGLYcerin (NITROSTAT) 0.4 MG sublingual tablet     potassium chloride ER (K-TAB/KLOR-CON) 10 MEQ CR tablet     potassium chloride ER (KLOR-CON M) 10 MEQ CR tablet     ranitidine (ZANTAC) 150 MG tablet     rivaroxaban ANTICOAGULANT (XARELTO ANTICOAGULANT) 20 MG TABS tablet     tamsulosin (FLOMAX) 0.4 MG capsule     triamcinolone (KENALOG) 0.1 % external cream       Allergies:  Tetanus immune globulin and Tetanus toxoids    Relevant labs, images, EKGs, Epic and outside hospital (if applicable) charts were reviewed. The findings, diagnosis, plan, and need for follow up were discussed with the patient/family. Nursing notes were reviewed.      Ghassan Contreras MD  11/27/22 9504

## 2022-11-27 NOTE — DISCHARGE INSTRUCTIONS
Urinalysis looks okay.  No signs of infection.    Glucose is elevated in the urine.  This could be due to your diabetes.    Schedule clinic follow-up appointment with your primary care provider in 1 to 2 weeks.    Results for orders placed or performed during the hospital encounter of 11/27/22   UA reflex to Microscopic and Culture     Status: Abnormal    Specimen: Urine, Midstream   Result Value Ref Range    Color Urine Light Yellow Colorless, Straw, Light Yellow, Yellow    Appearance Urine Clear Clear    Glucose Urine >1000 (A) Negative mg/dL    Bilirubin Urine Negative Negative    Ketones Urine 100 (A) Negative mg/dL    Specific Gravity Urine 1.027 1.000 - 1.030    Blood Urine Small (A) Negative    pH Urine 5.0 5.0 - 9.0    Protein Albumin Urine 50 (A) Negative mg/dL    Urobilinogen Urine Normal Normal, 2.0 mg/dL    Nitrite Urine Negative Negative    Leukocyte Esterase Urine Negative Negative    Mucus Urine Present (A) None Seen /LPF    RBC Urine 1 <=2 /HPF    WBC Urine <1 <=5 /HPF    Narrative    Urine Culture not indicated

## 2022-11-27 NOTE — ED TRIAGE NOTES
Pt here by [facility] into [facility], pt reports low back pain and urinary frequency since yesterday, pt also reports a cough, VSS, no acute distress     Triage Assessment     Row Name 11/27/22 0993       Triage Assessment (Adult)    Airway WDL WDL       Respiratory WDL    Respiratory WDL WDL       Skin Circulation/Temperature WDL    Skin Circulation/Temperature WDL WDL       Cardiac WDL    Cardiac WDL WDL       Peripheral/Neurovascular WDL    Peripheral Neurovascular WDL WDL       Cognitive/Neuro/Behavioral WDL    Cognitive/Neuro/Behavioral WDL WDL

## 2023-05-27 ENCOUNTER — OFFICE VISIT (OUTPATIENT)
Dept: FAMILY MEDICINE | Facility: OTHER | Age: 83
End: 2023-05-27
Payer: COMMERCIAL

## 2023-05-27 ENCOUNTER — HOSPITAL ENCOUNTER (OUTPATIENT)
Dept: GENERAL RADIOLOGY | Facility: OTHER | Age: 83
Discharge: HOME | End: 2023-05-27
Payer: COMMERCIAL

## 2023-05-27 VITALS
WEIGHT: 196.8 LBS | HEIGHT: 65 IN | BODY MASS INDEX: 32.79 KG/M2 | SYSTOLIC BLOOD PRESSURE: 122 MMHG | OXYGEN SATURATION: 95 % | DIASTOLIC BLOOD PRESSURE: 80 MMHG | RESPIRATION RATE: 20 BRPM | TEMPERATURE: 97.9 F | HEART RATE: 75 BPM

## 2023-05-27 DIAGNOSIS — R07.81 RIB PAIN ON LEFT SIDE: ICD-10-CM

## 2023-05-27 DIAGNOSIS — M25.552 HIP PAIN, LEFT: Primary | ICD-10-CM

## 2023-05-27 PROCEDURE — G0463 HOSPITAL OUTPT CLINIC VISIT: HCPCS

## 2023-05-27 PROCEDURE — 99213 OFFICE O/P EST LOW 20 MIN: CPT

## 2023-05-27 PROCEDURE — 71101 X-RAY EXAM UNILAT RIBS/CHEST: CPT | Mod: LT

## 2023-05-27 PROCEDURE — 73502 X-RAY EXAM HIP UNI 2-3 VIEWS: CPT

## 2023-05-27 RX ORDER — POLYETHYLENE GLYCOL 3350 17 G/17G
POWDER, FOR SOLUTION ORAL
COMMUNITY
Start: 2021-07-29

## 2023-05-27 RX ORDER — GLIPIZIDE 5 MG/1
5 TABLET ORAL
COMMUNITY
Start: 2021-07-29

## 2023-05-27 RX ORDER — BLOOD SUGAR DIAGNOSTIC
STRIP MISCELLANEOUS
COMMUNITY
Start: 2023-04-12

## 2023-05-27 RX ORDER — LIDOCAINE 40 MG/G
CREAM TOPICAL
COMMUNITY
Start: 2021-07-29

## 2023-05-27 RX ORDER — SEMAGLUTIDE 1.34 MG/ML
INJECTION, SOLUTION SUBCUTANEOUS
COMMUNITY
Start: 2021-07-29

## 2023-05-27 RX ORDER — LISINOPRIL 5 MG/1
TABLET ORAL
COMMUNITY
Start: 2023-01-01

## 2023-05-27 RX ORDER — ATORVASTATIN CALCIUM 40 MG/1
TABLET, FILM COATED ORAL
COMMUNITY
Start: 2023-01-01

## 2023-05-27 RX ORDER — LOPERAMIDE HCL 2 MG
CAPSULE ORAL
COMMUNITY
Start: 2023-01-01

## 2023-05-27 RX ORDER — HYDROCORTISONE 2.5 %
CREAM (GRAM) TOPICAL
COMMUNITY
Start: 2021-07-29

## 2023-05-27 RX ORDER — CALCIUM CARBONATE 500(1250)
TABLET,CHEWABLE ORAL
COMMUNITY
Start: 2021-07-29

## 2023-05-27 ASSESSMENT — PAIN SCALES - GENERAL: PAINLEVEL: MODERATE PAIN (5)
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
## 2023-08-26 ENCOUNTER — HOSPITAL ENCOUNTER (EMERGENCY)
Facility: OTHER | Age: 83
Discharge: HOME | End: 2023-08-26
Attending: PHYSICIAN ASSISTANT | Admitting: PHYSICIAN ASSISTANT
Payer: COMMERCIAL

## 2023-08-26 ENCOUNTER — APPOINTMENT (OUTPATIENT)
Dept: ULTRASOUND IMAGING | Facility: OTHER | Age: 83
End: 2023-08-26
Attending: PHYSICIAN ASSISTANT
Payer: COMMERCIAL

## 2023-08-26 VITALS
HEIGHT: 65 IN | BODY MASS INDEX: 32.65 KG/M2 | OXYGEN SATURATION: 95 % | HEART RATE: 82 BPM | TEMPERATURE: 97.8 F | RESPIRATION RATE: 16 BRPM | DIASTOLIC BLOOD PRESSURE: 77 MMHG | WEIGHT: 196 LBS | SYSTOLIC BLOOD PRESSURE: 130 MMHG

## 2023-08-26 DIAGNOSIS — S80.821A BLISTER OF RIGHT LOWER EXTREMITY, INITIAL ENCOUNTER: ICD-10-CM

## 2023-08-26 DIAGNOSIS — R60.0 BILATERAL LEG EDEMA: ICD-10-CM

## 2023-08-26 DIAGNOSIS — S81.801A WOUND OF RIGHT LOWER EXTREMITY, INITIAL ENCOUNTER: ICD-10-CM

## 2023-08-26 LAB
ALBUMIN SERPL BCG-MCNC: 4.5 G/DL (ref 3.5–5.2)
ALP SERPL-CCNC: 76 U/L (ref 40–129)
ALT SERPL W P-5'-P-CCNC: 17 U/L (ref 0–70)
ANION GAP SERPL CALCULATED.3IONS-SCNC: 12 MMOL/L (ref 7–15)
AST SERPL W P-5'-P-CCNC: 21 U/L (ref 0–45)
BASOPHILS # BLD AUTO: 0.1 10E3/UL (ref 0–0.2)
BASOPHILS NFR BLD AUTO: 1 %
BILIRUB SERPL-MCNC: 1 MG/DL
BUN SERPL-MCNC: 14.5 MG/DL (ref 8–23)
CALCIUM SERPL-MCNC: 10.1 MG/DL (ref 8.8–10.2)
CHLORIDE SERPL-SCNC: 99 MMOL/L (ref 98–107)
CREAT SERPL-MCNC: 0.96 MG/DL (ref 0.67–1.17)
CRP SERPL-MCNC: 6.45 MG/L
DEPRECATED HCO3 PLAS-SCNC: 27 MMOL/L (ref 22–29)
EOSINOPHIL # BLD AUTO: 0.1 10E3/UL (ref 0–0.7)
EOSINOPHIL NFR BLD AUTO: 1 %
ERYTHROCYTE [DISTWIDTH] IN BLOOD BY AUTOMATED COUNT: 15.2 % (ref 10–15)
ERYTHROCYTE [SEDIMENTATION RATE] IN BLOOD BY WESTERGREN METHOD: 14 MM/HR (ref 0–20)
GFR SERPL CREATININE-BSD FRML MDRD: 80 ML/MIN/1.73M2
GLUCOSE BLDC GLUCOMTR-MCNC: 202 MG/DL (ref 70–99)
GLUCOSE SERPL-MCNC: 248 MG/DL (ref 70–99)
HCT VFR BLD AUTO: 53 % (ref 40–53)
HGB BLD-MCNC: 17.7 G/DL (ref 13.3–17.7)
HOLD SPECIMEN: NORMAL
IMM GRANULOCYTES # BLD: 0.1 10E3/UL
IMM GRANULOCYTES NFR BLD: 1 %
LACTATE SERPL-SCNC: 1.5 MMOL/L (ref 0.7–2)
LYMPHOCYTES # BLD AUTO: 1.7 10E3/UL (ref 0.8–5.3)
LYMPHOCYTES NFR BLD AUTO: 21 %
MAGNESIUM SERPL-MCNC: 2.4 MG/DL (ref 1.7–2.3)
MCH RBC QN AUTO: 28.3 PG (ref 26.5–33)
MCHC RBC AUTO-ENTMCNC: 33.4 G/DL (ref 31.5–36.5)
MCV RBC AUTO: 85 FL (ref 78–100)
MONOCYTES # BLD AUTO: 0.5 10E3/UL (ref 0–1.3)
MONOCYTES NFR BLD AUTO: 6 %
NEUTROPHILS # BLD AUTO: 5.9 10E3/UL (ref 1.6–8.3)
NEUTROPHILS NFR BLD AUTO: 70 %
NRBC # BLD AUTO: 0 10E3/UL
NRBC BLD AUTO-RTO: 0 /100
NT-PROBNP SERPL-MCNC: 340 PG/ML (ref 0–1800)
PLATELET # BLD AUTO: 237 10E3/UL (ref 150–450)
POTASSIUM SERPL-SCNC: 4.6 MMOL/L (ref 3.4–5.3)
PROCALCITONIN SERPL IA-MCNC: 0.04 NG/ML
PROT SERPL-MCNC: 7.9 G/DL (ref 6.4–8.3)
RBC # BLD AUTO: 6.25 10E6/UL (ref 4.4–5.9)
SODIUM SERPL-SCNC: 138 MMOL/L (ref 136–145)
WBC # BLD AUTO: 8.3 10E3/UL (ref 4–11)

## 2023-08-26 PROCEDURE — 84145 PROCALCITONIN (PCT): CPT | Performed by: PHYSICIAN ASSISTANT

## 2023-08-26 PROCEDURE — 80053 COMPREHEN METABOLIC PANEL: CPT | Performed by: PHYSICIAN ASSISTANT

## 2023-08-26 PROCEDURE — 93971 EXTREMITY STUDY: CPT | Mod: TC,RT

## 2023-08-26 PROCEDURE — 83605 ASSAY OF LACTIC ACID: CPT | Performed by: PHYSICIAN ASSISTANT

## 2023-08-26 PROCEDURE — 86140 C-REACTIVE PROTEIN: CPT | Performed by: PHYSICIAN ASSISTANT

## 2023-08-26 PROCEDURE — 82962 GLUCOSE BLOOD TEST: CPT

## 2023-08-26 PROCEDURE — 99284 EMERGENCY DEPT VISIT MOD MDM: CPT | Performed by: PHYSICIAN ASSISTANT

## 2023-08-26 PROCEDURE — 250N000011 HC RX IP 250 OP 636: Performed by: PHYSICIAN ASSISTANT

## 2023-08-26 PROCEDURE — 96374 THER/PROPH/DIAG INJ IV PUSH: CPT

## 2023-08-26 PROCEDURE — 83735 ASSAY OF MAGNESIUM: CPT | Performed by: PHYSICIAN ASSISTANT

## 2023-08-26 PROCEDURE — 99285 EMERGENCY DEPT VISIT HI MDM: CPT | Mod: 25

## 2023-08-26 PROCEDURE — 83880 ASSAY OF NATRIURETIC PEPTIDE: CPT | Performed by: PHYSICIAN ASSISTANT

## 2023-08-26 PROCEDURE — 36415 COLL VENOUS BLD VENIPUNCTURE: CPT | Performed by: PHYSICIAN ASSISTANT

## 2023-08-26 PROCEDURE — 85025 COMPLETE CBC W/AUTO DIFF WBC: CPT | Performed by: PHYSICIAN ASSISTANT

## 2023-08-26 PROCEDURE — 85652 RBC SED RATE AUTOMATED: CPT | Performed by: PHYSICIAN ASSISTANT

## 2023-08-26 RX ORDER — CEFAZOLIN SODIUM/WATER 2 G/20 ML
2 SYRINGE (ML) INTRAVENOUS ONCE
Status: COMPLETED | OUTPATIENT
Start: 2023-08-26 | End: 2023-08-26

## 2023-08-26 RX ORDER — CEPHALEXIN 500 MG/1
500 CAPSULE ORAL 4 TIMES DAILY
Qty: 30 CAPSULE | Refills: 0 | Status: SHIPPED | OUTPATIENT
Start: 2023-08-26 | End: 2023-08-26

## 2023-08-26 RX ORDER — CEPHALEXIN 500 MG/1
500 CAPSULE ORAL 4 TIMES DAILY
Qty: 30 CAPSULE | Refills: 0 | Status: SHIPPED | OUTPATIENT
Start: 2023-08-26

## 2023-08-26 RX ADMIN — Medication 2 G: at 16:11

## 2023-08-26 ASSESSMENT — ACTIVITIES OF DAILY LIVING (ADL): ADLS_ACUITY_SCORE: 37

## 2023-08-26 NOTE — DISCHARGE INSTRUCTIONS
-Start taking Keflex 500 mg every 6 hours for 7 days.  Take first dose this evening.  -I placed referral for both wound care and family practice for reevaluation of her wound early next week.  -If you have any worsening of symptoms despite being on Keflex, please return to the ER for repeat evaluation.

## 2023-08-26 NOTE — ED TRIAGE NOTES
Pt reports having blisters develop on his legs. Pt is a diabetic.    Radha Bone RN on 8/26/2023 at 12:48 PM       Triage Assessment       Row Name 08/26/23 1248       Skin Circulation/Temperature WDL    Skin Circulation/Temperature WDL X  blisters opened wound

## 2023-08-27 NOTE — ED PROVIDER NOTES
"      EMERGENCY DEPARTMENT ENCOUNTER      NAME: Jonatan Das  AGE: 82 year old male  YOB: 1940  MRN: [id number]  EVALUATION DATE & TIME: 8/26/2023  1:24 PM    PCP: No Ref-Primary, Physician    ED PROVIDER: Ash Camargo PA-C       CHIEF COMPLAINT:  Chief Complaint   Patient presents with    Wound Check       ED COURSE, MEDICAL DECISION MAKING, FINAL IMPRESSION AND PLAN:     The patient was interviewed and examined.  HPI and physical exam as below.  Differential diagnosis and MDM Key Documentation Elements as below.  Vitals and triage note were reviewed.  /77   Pulse 82   Temp 97.8  F (36.6  C) (Tympanic)   Resp 16   Ht 1.651 m (5' 5\")   Wt 88.9 kg (196 lb)   SpO2 95%   BMI 32.62 kg/m      Jonatan Das is a pleasant 82 year old male [employer]  with history of congestive heart failure type 2 diabetes who presents to the ER today for concerns of possible right leg infection.  Patient states that he has a small wound as well as a blister developing on his right lower extremity.  Patient having increasing pain.  Denies any fever or chills.  No numbness or paresthesias.  Pain is described as mild to moderate worse with palpation.  No radiation of pain.    Differential includes but is not limited to leg cellulitis, leg edema, necrotizing fasciitis    Patient is afebrile with otherwise normal vitals.  Patient was in no acute distress.  Patient with bilateral leg edema.  Blister and open wound to the right lower extremity.    Assessment:  1. Wound of left lower extremity, initial encounter    2. Blister of left lower extremity, initial encounter    3. Bilateral leg edema      Plan:  1.  Wound of the left lower extremity with blister  -Patient with a small 1 cm ulceration/wound to the right anterior leg with a 3 x 3 blister just distal to this wound.  No signs of necrotizing fasciitis.  -Patient was given IV Ancef here in the ER.  -Patient use Keflex 70 mg every 6 hours for 7 " days.  -Referrals to wound care and primary care were entered  -Return to the ER for any worsening symptoms    2.  Bilateral leg edema  -Chronic in nature.  Right venous ultrasound negative for acute DVT.  .  Follow-up primary care.  No leukocytosis but some elevation of inflammatory markers.  Patient follow-up with primary care.    Pertinent Labs & Imaging studies reviewed. (See chart for details)  Results for orders placed or performed during the hospital encounter of 08/26/23   US Lower Extremity Venous Duplex Right    Impression    Impression: No evidence of deep venous thrombosis within the right  lower extremity.    MARILYN MCDANIEL MD         SYSTEM ID:  [id number]   Comprehensive metabolic panel   Result Value Ref Range    Sodium 138 136 - 145 mmol/L    Potassium 4.6 3.4 - 5.3 mmol/L    Chloride 99 98 - 107 mmol/L    Carbon Dioxide (CO2) 27 22 - 29 mmol/L    Anion Gap 12 7 - 15 mmol/L    Urea Nitrogen 14.5 8.0 - 23.0 mg/dL    Creatinine 0.96 0.67 - 1.17 mg/dL    Calcium 10.1 8.8 - 10.2 mg/dL    Glucose 248 (H) 70 - 99 mg/dL    Alkaline Phosphatase 76 40 - 129 U/L    AST 21 0 - 45 U/L    ALT 17 0 - 70 U/L    Protein Total 7.9 6.4 - 8.3 g/dL    Albumin 4.5 3.5 - 5.2 g/dL    Bilirubin Total 1.0 <=1.2 mg/dL    GFR Estimate 80 >60 mL/min/1.73m2   Lactic acid whole blood   Result Value Ref Range    Lactic Acid 1.5 0.7 - 2.0 mmol/L   Result Value Ref Range    Magnesium 2.4 (H) 1.7 - 2.3 mg/dL   Nt probnp inpatient (BNP)   Result Value Ref Range    N terminal Pro BNP Inpatient 340 0 - 1,800 pg/mL   Result Value Ref Range    Procalcitonin 0.04 <0.05 ng/mL   Result Value Ref Range    CRP Inflammation 6.45 (H) <5.00 mg/L   Erythrocyte sedimentation rate auto   Result Value Ref Range    Erythrocyte Sedimentation Rate 14 0 - 20 mm/hr   CBC with platelets and differential   Result Value Ref Range    WBC Count 8.3 4.0 - 11.0 10e3/uL    RBC Count 6.25 (H) 4.40 - 5.90 10e6/uL    Hemoglobin 17.7 13.3 - 17.7 g/dL     Hematocrit 53.0 40.0 - 53.0 %    MCV 85 78 - 100 fL    MCH 28.3 26.5 - 33.0 pg    MCHC 33.4 31.5 - 36.5 g/dL    RDW 15.2 (H) 10.0 - 15.0 %    Platelet Count 237 150 - 450 10e3/uL    % Neutrophils 70 %    % Lymphocytes 21 %    % Monocytes 6 %    % Eosinophils 1 %    % Basophils 1 %    % Immature Granulocytes 1 %    NRBCs per 100 WBC 0 <1 /100    Absolute Neutrophils 5.9 1.6 - 8.3 10e3/uL    Absolute Lymphocytes 1.7 0.8 - 5.3 10e3/uL    Absolute Monocytes 0.5 0.0 - 1.3 10e3/uL    Absolute Eosinophils 0.1 0.0 - 0.7 10e3/uL    Absolute Basophils 0.1 0.0 - 0.2 10e3/uL    Absolute Immature Granulocytes 0.1 <=0.4 10e3/uL    Absolute NRBCs 0.0 10e3/uL   Extra Blue Top Tube   Result Value Ref Range    Hold Specimen JIC    Extra Red Top Tube   Result Value Ref Range    Hold Specimen JIC    Glucose by meter   Result Value Ref Range    GLUCOSE BY METER POCT 202 (H) 70 - 99 mg/dL   Extra Serum Separator Tube (SST)   Result Value Ref Range    Hold Specimen JIC      No results found for: ABORH    Medications given during today's ER visit:  Medications   ceFAZolin Sodium (ANCEF) injection 2 g (2 g Intravenous $Given 8/26/23 1611)       New prescriptions started at today's ER visit  Discharge Medication List as of 8/26/2023  4:35 PM        Reassessments, Medications, Interventions, & Response to Treatments:  Multiple reassessments.  Patient remained stable here in the ER.  Discussed laboratory and imaging results.  Discussed treatment plan and follow-up.    Consultations:  None    Decision Rules, Medical Calculators, and Risk Stratification Tools:  None    MDM Key Documentation Elements for Patient's Evaluation:  Differential diagnosis to include high risk considerations: As above  Escalation to admission/observation considered: Admission/observation considered, but patient does not meet admission criteria  Discussions and management with other clinicians:    3a. Independent interpretation of testing performed by another health  professional:  -No  3b. Discussion of management or test interpretation with another health professional: -No  Independent interpretation of tests:  Ordering and/or review of 3+ test(s)  Discussion of test interpretations with radiology:  No  History obtained from source other than patient or assessment requiring an independent historian:  No  Review of non-ED/external records:  review of 3+ records  Diagnostic tests considered but not ultimately performed/deferred:  -X-rays right tib-fib  Prescription medications considered but not prescribed:  -Clindamycin  Chronic conditions affecting care:  -Type 2 diabetes  Care affected by social determinants of health:  -None    The patient's management involved:   - Laboratory studies  - Imaging studies  - Parenteral controlled substance  - Prescription drug management      A shared decision making model was used. Time was taken to answer all questions.  Patient and/or associated parties understood and were agreeable to treatment plan.  Plan and all results were discussed. Warning signs and close return precautions to return to the ED given. Copy of results given. Discharged in stable condition. Discharged with discharge instructions outlining plan for further care and follow up.      PPE worn during patient evaluation:  Mask: No  Eye Protection: No  Gown: No  Hair cover: No  Face Shield: No  Patient wearing a mask: No    =================================================================    HPI  Jonatan Das is a pleasant 82 year old male [employer]  with history of congestive heart failure type 2 diabetes who presents to the ER today for concerns of possible right leg infection.  Patient states that he has a small wound as well as a blister developing on his right lower extremity.  Patient having increasing pain.  Denies any fever or chills.  No numbness or paresthesias.  Pain is described as mild to moderate worse with palpation.  No radiation of pain.      REVIEW OF  SYSTEMS   Review of Systems  As above, otherwise ROS is unremarkable.      PAST MEDICAL HISTORY:  Past Medical History:   Diagnosis Date    Arthritis     CVA (cerebral infarction)     Diabetes (H)        PAST SURGICAL HISTORY:  Past Surgical History:   Procedure Laterality Date    CHOLECYSTECTOMY      GENITOURINARY SURGERY      ORTHOPEDIC SURGERY      knee surgery       CURRENT MEDICATIONS:    Current Outpatient Medications   Medication Instructions    ACCU-CHEK GUIDE test strip No dose, route, or frequency recorded.    acetaminophen (TYLENOL) 650 mg, Oral, EVERY 4 HOURS PRN    acetaminophen-codeine (TYLENOL #3) 300-30 MG per tablet 1-2 tablets, Oral, EVERY 4 HOURS PRN    ASPIRIN  mg, DAILY    atorvastatin (LIPITOR) 40 MG tablet No dose, route, or frequency recorded.    ATORVASTATIN CALCIUM PO 80 mg, Oral, DAILY    bismuth subsalicylate (KAOPECTATE) 262 MG/15ML suspension Shake well before taking. - 30 ml prn    calcium carbonate 500 mg, elemental, 1250 (500 Ca) MG tablet chewable Chew thoroughly prn    cephALEXin (KEFLEX) 500 mg, Oral, 4 TIMES DAILY    empagliflozin (JARDIANCE) 25 MG TABS tablet 1 tablet, Oral, DAILY    furosemide (LASIX) 20 mg, Oral, 2 TIMES DAILY    gabapentin (NEURONTIN) 300 MG capsule No dose, route, or frequency recorded.    glipiZIDE (GLUCOTROL) 5 mg, Oral    hydrocortisone 2.5 % cream Uses prn.    insulin glargine (LANTUS VIAL) 50 Units, 2 TIMES DAILY    lidocaine (LMX4) 4 % external cream Uses 5% dosage 3 times daily prn.    lisinopril (ZESTRIL) 5 MG tablet No dose, route, or frequency recorded.    LISINOPRIL PO 2.5 mg, DAILY    loperamide (IMODIUM) 2 MG capsule No dose, route, or frequency recorded.    METFORMIN HCL PO 1,000 mg, Oral, DAILY WITH BREAKFAST    methotrexate 2.5 MG tablet No dose, route, or frequency recorded.    metoprolol succinate ER (TOPROL XL) 25 mg, Oral, DAILY    nitroGLYcerin (NITROSTAT) 0.4 MG sublingual tablet No dose, route, or frequency recorded.     "polyethylene glycol (MIRALAX) 17 GM/Dose powder Mix in 8 oz of water, juice, soda, coffee, or tea prior to administration.    potassium chloride ER (K-TAB/KLOR-CON) 10 MEQ CR tablet No dose, route, or frequency recorded.    potassium chloride ER (KLOR-CON M) 10 MEQ CR tablet 10 mEq, Oral, 2 TIMES DAILY    ranitidine (ZANTAC) 150 mg    rivaroxaban ANTICOAGULANT (XARELTO ANTICOAGULANT) 20 MG TABS tablet No dose, route, or frequency recorded.    semaglutide (OZEMPIC, 1 MG/DOSE,) 2 MG/1.5ML pen No dose, route, or frequency recorded.    Simethicone 125 MG TABS No dose, route, or frequency recorded.    tamsulosin (FLOMAX) 0.4 mg, Oral    triamcinolone (KENALOG) 0.1 % external cream No dose, route, or frequency recorded.       ALLERGIES:  Allergies   Allergen Reactions    Tetanus Immune Globulin Other (See Comments) and Unknown     Patient states he \"went out like a light\" and \"fell on the floor\" after a tetanus shot.    Tetanus Toxoids        FAMILY HISTORY:  No family history on file.    SOCIAL HISTORY:   Social History     Socioeconomic History    Marital status:    Tobacco Use    Smoking status: Never    Smokeless tobacco: Never   Vaping Use    Vaping Use: Never used   Substance and Sexual Activity    Alcohol use: No    Drug use: No    Sexual activity: Not Currently     Partners: Female       PHYSICAL EXAM    VITAL SIGNS: /77   Pulse 82   Temp 97.8  F (36.6  C) (Tympanic)   Resp 16   Ht 1.651 m (5' 5\")   Wt 88.9 kg (196 lb)   SpO2 95%   BMI 32.62 kg/m      Patient Vitals for the past 24 hrs:   BP Temp Temp src Pulse Resp SpO2 Height Weight   08/26/23 1655 130/77 -- -- -- -- -- -- --   08/26/23 1246 120/73 97.8  F (36.6  C) Tympanic 82 16 95 % 1.651 m (5' 5\") 88.9 kg (196 lb)       Physical Exam  Vitals and nursing note reviewed.   Constitutional:       General: Active.   HENT:      Nose: Nose normal.      Mouth/Throat:      Mouth: Mucous membranes are moist.      Pharynx: Oropharynx is clear. "   Eyes:      Conjunctiva/sclera: Conjunctivae normal.   Musculoskeletal:      Comments: Bilateral lower extremity pitting edema 1-2+.  Patient with a 1 cm open wound to the right anterior shin with no drainage.  Patient with a roughly 2 x 2 centimeter blister that is now broken over the right anterior shin just distal to the open wound.  Some mild tenderness palpation.  No significant erythema or warmth.  Patient is otherwise neuro vas intact in the right lower extremity no signs of ischemia.  Skin:     General: Skin is warm and dry.   Neurological:      General: No focal deficit present.      Mental Status: Alert and oriented for age.   Psychiatric:         Mood and Affect: Mood normal.      LABS & RADIOLOGY:  All pertinent labs reviewed and interpreted. Reviewed all pertinent imaging. Please see official radiology report.  Results for orders placed or performed during the hospital encounter of 08/26/23   US Lower Extremity Venous Duplex Right    Impression    Impression: No evidence of deep venous thrombosis within the right  lower extremity.    MARILYN MCDANIEL MD         SYSTEM ID:  [id number]   Comprehensive metabolic panel   Result Value Ref Range    Sodium 138 136 - 145 mmol/L    Potassium 4.6 3.4 - 5.3 mmol/L    Chloride 99 98 - 107 mmol/L    Carbon Dioxide (CO2) 27 22 - 29 mmol/L    Anion Gap 12 7 - 15 mmol/L    Urea Nitrogen 14.5 8.0 - 23.0 mg/dL    Creatinine 0.96 0.67 - 1.17 mg/dL    Calcium 10.1 8.8 - 10.2 mg/dL    Glucose 248 (H) 70 - 99 mg/dL    Alkaline Phosphatase 76 40 - 129 U/L    AST 21 0 - 45 U/L    ALT 17 0 - 70 U/L    Protein Total 7.9 6.4 - 8.3 g/dL    Albumin 4.5 3.5 - 5.2 g/dL    Bilirubin Total 1.0 <=1.2 mg/dL    GFR Estimate 80 >60 mL/min/1.73m2   Lactic acid whole blood   Result Value Ref Range    Lactic Acid 1.5 0.7 - 2.0 mmol/L   Result Value Ref Range    Magnesium 2.4 (H) 1.7 - 2.3 mg/dL   Nt probnp inpatient (BNP)   Result Value Ref Range    N terminal Pro BNP Inpatient 340 0 -  1,800 pg/mL   Result Value Ref Range    Procalcitonin 0.04 <0.05 ng/mL   Result Value Ref Range    CRP Inflammation 6.45 (H) <5.00 mg/L   Erythrocyte sedimentation rate auto   Result Value Ref Range    Erythrocyte Sedimentation Rate 14 0 - 20 mm/hr   CBC with platelets and differential   Result Value Ref Range    WBC Count 8.3 4.0 - 11.0 10e3/uL    RBC Count 6.25 (H) 4.40 - 5.90 10e6/uL    Hemoglobin 17.7 13.3 - 17.7 g/dL    Hematocrit 53.0 40.0 - 53.0 %    MCV 85 78 - 100 fL    MCH 28.3 26.5 - 33.0 pg    MCHC 33.4 31.5 - 36.5 g/dL    RDW 15.2 (H) 10.0 - 15.0 %    Platelet Count 237 150 - 450 10e3/uL    % Neutrophils 70 %    % Lymphocytes 21 %    % Monocytes 6 %    % Eosinophils 1 %    % Basophils 1 %    % Immature Granulocytes 1 %    NRBCs per 100 WBC 0 <1 /100    Absolute Neutrophils 5.9 1.6 - 8.3 10e3/uL    Absolute Lymphocytes 1.7 0.8 - 5.3 10e3/uL    Absolute Monocytes 0.5 0.0 - 1.3 10e3/uL    Absolute Eosinophils 0.1 0.0 - 0.7 10e3/uL    Absolute Basophils 0.1 0.0 - 0.2 10e3/uL    Absolute Immature Granulocytes 0.1 <=0.4 10e3/uL    Absolute NRBCs 0.0 10e3/uL   Extra Blue Top Tube   Result Value Ref Range    Hold Specimen JIC    Extra Red Top Tube   Result Value Ref Range    Hold Specimen JIC    Glucose by meter   Result Value Ref Range    GLUCOSE BY METER POCT 202 (H) 70 - 99 mg/dL   Extra Serum Separator Tube (SST)   Result Value Ref Range    Hold Specimen JIC      US Lower Extremity Venous Duplex Right   Final Result   Impression: No evidence of deep venous thrombosis within the right   lower extremity.      MARILYN MCDANIEL MD            SYSTEM ID:  [id number]          I, Jewel Camargo PA-C, personally performed the services described in this documentation, and it is both accurate and complete.       Ash Camargo PA-C  08/26/23 6748

## (undated) RX ORDER — CEFAZOLIN SODIUM 1 G/3ML
INJECTION, POWDER, FOR SOLUTION INTRAMUSCULAR; INTRAVENOUS
Status: DISPENSED
Start: 2023-08-26

## (undated) RX ORDER — ASPIRIN 81 MG/1
TABLET, CHEWABLE ORAL
Status: DISPENSED
Start: 2021-01-30